# Patient Record
Sex: FEMALE | Race: OTHER | HISPANIC OR LATINO | Employment: UNEMPLOYED | URBAN - METROPOLITAN AREA
[De-identification: names, ages, dates, MRNs, and addresses within clinical notes are randomized per-mention and may not be internally consistent; named-entity substitution may affect disease eponyms.]

---

## 2022-09-22 ENCOUNTER — LAB VISIT (OUTPATIENT)
Dept: LAB | Facility: HOSPITAL | Age: 37
End: 2022-09-22
Attending: PSYCHIATRY & NEUROLOGY

## 2022-09-22 ENCOUNTER — OFFICE VISIT (OUTPATIENT)
Dept: NEUROLOGY | Facility: CLINIC | Age: 37
End: 2022-09-22

## 2022-09-22 VITALS
HEIGHT: 59 IN | BODY MASS INDEX: 25.38 KG/M2 | HEART RATE: 60 BPM | RESPIRATION RATE: 12 BRPM | WEIGHT: 125.88 LBS | DIASTOLIC BLOOD PRESSURE: 68 MMHG | SYSTOLIC BLOOD PRESSURE: 94 MMHG

## 2022-09-22 DIAGNOSIS — R71.8 ELEVATED MCV: ICD-10-CM

## 2022-09-22 DIAGNOSIS — R93.0 ABNORMAL CT OF THE HEAD: ICD-10-CM

## 2022-09-22 DIAGNOSIS — G40.419 OTHER GENERALIZED EPILEPSY, INTRACTABLE, WITHOUT STATUS EPILEPTICUS: Primary | ICD-10-CM

## 2022-09-22 LAB
FOLATE SERPL-MCNC: 10.9 NG/ML (ref 4–24)
VIT B12 SERPL-MCNC: 538 PG/ML (ref 210–950)

## 2022-09-22 PROCEDURE — 99205 PR OFFICE/OUTPT VISIT, NEW, LEVL V, 60-74 MIN: ICD-10-PCS | Mod: S$PBB,,, | Performed by: PSYCHIATRY & NEUROLOGY

## 2022-09-22 PROCEDURE — 99999 PR PBB SHADOW E&M-EST. PATIENT-LVL III: ICD-10-PCS | Mod: PBBFAC,,, | Performed by: PSYCHIATRY & NEUROLOGY

## 2022-09-22 PROCEDURE — 99999 PR PBB SHADOW E&M-EST. PATIENT-LVL III: CPT | Mod: PBBFAC,,, | Performed by: PSYCHIATRY & NEUROLOGY

## 2022-09-22 PROCEDURE — 99213 OFFICE O/P EST LOW 20 MIN: CPT | Mod: PBBFAC | Performed by: PSYCHIATRY & NEUROLOGY

## 2022-09-22 PROCEDURE — 82607 VITAMIN B-12: CPT | Performed by: PSYCHIATRY & NEUROLOGY

## 2022-09-22 PROCEDURE — 99205 OFFICE O/P NEW HI 60 MIN: CPT | Mod: S$PBB,,, | Performed by: PSYCHIATRY & NEUROLOGY

## 2022-09-22 PROCEDURE — 82746 ASSAY OF FOLIC ACID SERUM: CPT | Performed by: PSYCHIATRY & NEUROLOGY

## 2022-09-22 PROCEDURE — 36415 COLL VENOUS BLD VENIPUNCTURE: CPT | Performed by: PSYCHIATRY & NEUROLOGY

## 2022-09-22 RX ORDER — LEVETIRACETAM 500 MG/1
500 TABLET ORAL 2 TIMES DAILY
Qty: 60 TABLET | Refills: 11 | Status: ON HOLD | OUTPATIENT
Start: 2022-09-22 | End: 2022-10-02 | Stop reason: SDUPTHER

## 2022-09-22 RX ORDER — VALPROIC ACID 250 MG/1
250 CAPSULE, LIQUID FILLED ORAL 4 TIMES DAILY
Qty: 120 CAPSULE | Refills: 11 | Status: ON HOLD | OUTPATIENT
Start: 2022-09-22 | End: 2022-10-02 | Stop reason: HOSPADM

## 2022-09-22 NOTE — PROGRESS NOTES
USED    HPI: Jen March is a 37 y.o. female with seizure.  Seizures first began in her early childhood.     A typical seizure is described as beginning with aura of fear/anxiety and tears. She notes she has pain after a seizure and a great deal of distress emotionally  During a seizure, she feels something is moving her her head and she has tremors and she feels cold.  She has all of this bilaterally and looses time and forgets these spells.   Sometimes this occurs with sitting and she has a warning and can lie down or warn others.   Her nephew who has witnessed these spells states that seizures are stronger when she is out of meds. She talks about dead relatives and even claimed to be having a baby and named that baby  At other times she will not talk and bit her tongue.    Currently, she is having 2 seizures per week.   She is taking her medications fully only for the past 2 weeks. She had been out until then      She presented to the ER twice this summer with seizures and being out of meds/ needing refills. CT scan and labs from ER noted below    Normally, when she is able to get and take her medications daily, her seizures are milder and far less frequent.   She feels much better on meds. She has worse memory and overall her demeanor is changed off of medications.     She was born on time and denies prior infectious causes personally, but her mother was pregnant with her, her mother was very ill and it was believed to have effected her.   However, she states her seizure cause was never known. She did have a fever at 9 months old and seizures came after that per her aunt whom we contacted during the visit for additional history.       She is unemployed. She has not worked due to seizures.       She does not drive    She does not drink alcohol       Here with her  nephew today    She has one child. No plans for further children. Is not sexually active    Moved here from [place] this  year.     Review of Systems   Constitutional:  Negative for fever.   HENT:  Negative for nosebleeds.    Eyes:  Negative for double vision.   Respiratory:  Negative for hemoptysis.    Cardiovascular:  Negative for leg swelling.   Gastrointestinal:  Negative for blood in stool.   Genitourinary:  Negative for hematuria.   Skin:  Negative for rash.   Neurological:  Positive for seizures.   Endo/Heme/Allergies:  Does not bruise/bleed easily.   Psychiatric/Behavioral:  Positive for memory loss.        I have reviewed all of this patient's past medical and surgical histories as well as family and social histories and active allergies and medications as documented in the electronic medical record.        Exam:  Gen Appearance, well developed/nourished in no apparent distress  CV: 2+ distal pulses with no edema or swelling  Neuro:  MS: Awake, alert, oriented to place, person, time, situation. Sustains attention. Recent/remote memory intact, Language is full to spontaneous speech/repetition/naming/comprehension. Fund of Knowledge is full  CN: Optic discs are flat with normal vasculature, PERRL, Extraoccular movements and visual fields are full. Normal facial sensation and strength, Hearing symmetric, Tongue and Palate are midline and strong. Shoulder Shrug symmetric and strong.  Motor: Normal bulk, tone, no abnormal movements. 5/5 strength bilateral upper/lower extremities with 2+ reflexes and bilateral plantar response  Sensory: symmetric to light touch, pain, temp, and vibration/proprioception. Romberg negative  Cerebellar: Finger-nose,Heal-shin, Rapid alternating movements intact  Gait: Normal stance, no ataxia    Imagin2022 CT head: No acute intracranial hemorrhage or acute calvarial fracture.     Mild cerebellar atrophy which has been described in relation to seizure medication, please correlate.     6 mm calcification at the anteromedial right frontal cortex possibly reflecting postinfection calcification.    Labs:  2022 CBC shows elevated MCV count  CMP unremarkable  TSH, HIV and Hep C negative    Assessment/Plan: Jen March is a 37 y.o. female with seizures since her infancy  I recommend:     CT head 2022: mild cerebellar atrophy consistent with AED use  -also noted 6 mm calcification at the anteromedial right frontal cortex possibly reflecting postinfection calcification.  -Infection history noted in patient at age 9 months of age prior to seizures starting. Her mother also reportedly had a  infection and this may explain those findings    2. Clinically,  she does have tongue biting with spells. Although some spells seem emotionally mediated. Consider pseudoseizures in addition to epilepsy  -Will benefit from EEG when she has better funding (currently she is self pay)  -Continue current dose of Keppra 500mg and Depakote 250mg QID  -Asked her to keep a seizure diary so we can adjust accordingly for further seizures with more regular medication use  -will need routine labs on VPA    3. MCV elevation noted  -check B12 and folate levels  -denies EtOH use  -Elevated MCV is not a listed side effect of either of her AEDs  -She endorses some memory loss which may be related to seizures as well    4. Seizure precautions were fully reviewed with this patient who was instructed on:  No driving, no heights, no heavy machinery, no baths (shower instead), no swimming and no cooking on front burners of stoves.  The patient should be brought to the ER for any seizure activity lasting longer than  15 minutes or if two seizures are occurring back to back without full return to baseline mental status.    The patient was counseled on the teratogenic effects of anti-epileptic mediations including likely doubled rate of birth defects in infants born to mothers taking AEDs.  -She reports no plan for pregnancy/ not sexually active.       RTC 4 months    Total time spent on DOS including chart review:  60  minutes

## 2022-09-26 ENCOUNTER — HOSPITAL ENCOUNTER (INPATIENT)
Facility: HOSPITAL | Age: 37
LOS: 6 days | Discharge: HOME | DRG: 101 | End: 2022-10-02
Attending: INTERNAL MEDICINE | Admitting: INTERNAL MEDICINE
Payer: MEDICAID

## 2022-09-26 DIAGNOSIS — R56.9 SEIZURES: ICD-10-CM

## 2022-09-26 DIAGNOSIS — R07.9 CHEST PAIN: ICD-10-CM

## 2022-09-26 PROCEDURE — 11000001 HC ACUTE MED/SURG PRIVATE ROOM

## 2022-09-26 RX ORDER — POLYETHYLENE GLYCOL 3350 17 G/17G
17 POWDER, FOR SOLUTION ORAL DAILY
Status: DISCONTINUED | OUTPATIENT
Start: 2022-09-27 | End: 2022-10-02 | Stop reason: HOSPADM

## 2022-09-26 RX ORDER — LORAZEPAM 2 MG/ML
2 INJECTION INTRAMUSCULAR EVERY 4 HOURS PRN
Status: DISCONTINUED | OUTPATIENT
Start: 2022-09-26 | End: 2022-10-02 | Stop reason: HOSPADM

## 2022-09-26 RX ORDER — MAG HYDROX/ALUMINUM HYD/SIMETH 200-200-20
30 SUSPENSION, ORAL (FINAL DOSE FORM) ORAL 4 TIMES DAILY PRN
Status: DISCONTINUED | OUTPATIENT
Start: 2022-09-26 | End: 2022-10-02 | Stop reason: HOSPADM

## 2022-09-26 RX ORDER — LEVETIRACETAM 500 MG/1
1000 TABLET ORAL 2 TIMES DAILY
Status: DISCONTINUED | OUTPATIENT
Start: 2022-09-27 | End: 2022-09-28

## 2022-09-26 RX ORDER — NALOXONE HCL 0.4 MG/ML
0.02 VIAL (ML) INJECTION
Status: DISCONTINUED | OUTPATIENT
Start: 2022-09-26 | End: 2022-10-02 | Stop reason: HOSPADM

## 2022-09-26 RX ORDER — ACETAMINOPHEN 325 MG/1
650 TABLET ORAL EVERY 4 HOURS PRN
Status: DISCONTINUED | OUTPATIENT
Start: 2022-09-26 | End: 2022-10-02 | Stop reason: HOSPADM

## 2022-09-26 RX ORDER — IBUPROFEN 200 MG
24 TABLET ORAL
Status: DISCONTINUED | OUTPATIENT
Start: 2022-09-26 | End: 2022-10-02 | Stop reason: HOSPADM

## 2022-09-26 RX ORDER — IBUPROFEN 200 MG
16 TABLET ORAL
Status: DISCONTINUED | OUTPATIENT
Start: 2022-09-26 | End: 2022-10-02 | Stop reason: HOSPADM

## 2022-09-26 RX ORDER — AMOXICILLIN 250 MG
1 CAPSULE ORAL 2 TIMES DAILY PRN
Status: DISCONTINUED | OUTPATIENT
Start: 2022-09-26 | End: 2022-10-02 | Stop reason: HOSPADM

## 2022-09-26 RX ORDER — PROCHLORPERAZINE EDISYLATE 5 MG/ML
5 INJECTION INTRAMUSCULAR; INTRAVENOUS EVERY 6 HOURS PRN
Status: DISCONTINUED | OUTPATIENT
Start: 2022-09-26 | End: 2022-10-02 | Stop reason: HOSPADM

## 2022-09-26 RX ORDER — SIMETHICONE 80 MG
1 TABLET,CHEWABLE ORAL 4 TIMES DAILY PRN
Status: DISCONTINUED | OUTPATIENT
Start: 2022-09-26 | End: 2022-10-02 | Stop reason: HOSPADM

## 2022-09-26 RX ORDER — VALPROIC ACID 250 MG/1
250 CAPSULE, LIQUID FILLED ORAL 4 TIMES DAILY
Status: DISCONTINUED | OUTPATIENT
Start: 2022-09-27 | End: 2022-09-28

## 2022-09-26 RX ORDER — ONDANSETRON 2 MG/ML
4 INJECTION INTRAMUSCULAR; INTRAVENOUS EVERY 8 HOURS PRN
Status: DISCONTINUED | OUTPATIENT
Start: 2022-09-26 | End: 2022-10-02 | Stop reason: HOSPADM

## 2022-09-26 RX ORDER — SODIUM CHLORIDE 0.9 % (FLUSH) 0.9 %
10 SYRINGE (ML) INJECTION EVERY 12 HOURS PRN
Status: DISCONTINUED | OUTPATIENT
Start: 2022-09-26 | End: 2022-10-02 | Stop reason: HOSPADM

## 2022-09-26 RX ORDER — TALC
6 POWDER (GRAM) TOPICAL NIGHTLY PRN
Status: DISCONTINUED | OUTPATIENT
Start: 2022-09-26 | End: 2022-10-02 | Stop reason: HOSPADM

## 2022-09-26 RX ORDER — ENOXAPARIN SODIUM 100 MG/ML
40 INJECTION SUBCUTANEOUS EVERY 24 HOURS
Status: DISCONTINUED | OUTPATIENT
Start: 2022-09-26 | End: 2022-10-02 | Stop reason: HOSPADM

## 2022-09-26 RX ORDER — GLUCAGON 1 MG
1 KIT INJECTION
Status: DISCONTINUED | OUTPATIENT
Start: 2022-09-26 | End: 2022-10-02 | Stop reason: HOSPADM

## 2022-09-27 PROBLEM — R56.9 SEIZURES: Status: ACTIVE | Noted: 2022-09-27

## 2022-09-27 PROBLEM — F99 PSYCHIATRIC SYMPTOMS: Status: ACTIVE | Noted: 2022-09-27

## 2022-09-27 LAB
ALBUMIN SERPL BCP-MCNC: 3.4 G/DL (ref 3.5–5.2)
ALP SERPL-CCNC: 50 U/L (ref 55–135)
ALT SERPL W/O P-5'-P-CCNC: 9 U/L (ref 10–44)
ANION GAP SERPL CALC-SCNC: 6 MMOL/L (ref 8–16)
AST SERPL-CCNC: 17 U/L (ref 10–40)
BASOPHILS # BLD AUTO: 0.03 K/UL (ref 0–0.2)
BASOPHILS NFR BLD: 0.5 % (ref 0–1.9)
BILIRUB SERPL-MCNC: 0.4 MG/DL (ref 0.1–1)
BUN SERPL-MCNC: 7 MG/DL (ref 6–20)
CALCIUM SERPL-MCNC: 9 MG/DL (ref 8.7–10.5)
CHLORIDE SERPL-SCNC: 110 MMOL/L (ref 95–110)
CO2 SERPL-SCNC: 23 MMOL/L (ref 23–29)
CREAT SERPL-MCNC: 0.7 MG/DL (ref 0.5–1.4)
DIFFERENTIAL METHOD: ABNORMAL
EOSINOPHIL # BLD AUTO: 0.1 K/UL (ref 0–0.5)
EOSINOPHIL NFR BLD: 1.3 % (ref 0–8)
ERYTHROCYTE [DISTWIDTH] IN BLOOD BY AUTOMATED COUNT: 11.7 % (ref 11.5–14.5)
EST. GFR  (NO RACE VARIABLE): >60 ML/MIN/1.73 M^2
GLUCOSE SERPL-MCNC: 76 MG/DL (ref 70–110)
HCT VFR BLD AUTO: 37.8 % (ref 37–48.5)
HGB BLD-MCNC: 13 G/DL (ref 12–16)
IMM GRANULOCYTES # BLD AUTO: 0.01 K/UL (ref 0–0.04)
IMM GRANULOCYTES NFR BLD AUTO: 0.2 % (ref 0–0.5)
LYMPHOCYTES # BLD AUTO: 2.3 K/UL (ref 1–4.8)
LYMPHOCYTES NFR BLD: 38.2 % (ref 18–48)
MAGNESIUM SERPL-MCNC: 2.3 MG/DL (ref 1.6–2.6)
MCH RBC QN AUTO: 36.3 PG (ref 27–31)
MCHC RBC AUTO-ENTMCNC: 34.4 G/DL (ref 32–36)
MCV RBC AUTO: 106 FL (ref 82–98)
MONOCYTES # BLD AUTO: 0.7 K/UL (ref 0.3–1)
MONOCYTES NFR BLD: 12.1 % (ref 4–15)
NEUTROPHILS # BLD AUTO: 2.9 K/UL (ref 1.8–7.7)
NEUTROPHILS NFR BLD: 47.7 % (ref 38–73)
NRBC BLD-RTO: 0 /100 WBC
PHOSPHATE SERPL-MCNC: 3.7 MG/DL (ref 2.7–4.5)
PLATELET # BLD AUTO: 114 K/UL (ref 150–450)
PMV BLD AUTO: 11.4 FL (ref 9.2–12.9)
POTASSIUM SERPL-SCNC: 4 MMOL/L (ref 3.5–5.1)
PROT SERPL-MCNC: 6.6 G/DL (ref 6–8.4)
RBC # BLD AUTO: 3.58 M/UL (ref 4–5.4)
SODIUM SERPL-SCNC: 139 MMOL/L (ref 136–145)
WBC # BLD AUTO: 6.1 K/UL (ref 3.9–12.7)

## 2022-09-27 PROCEDURE — 84100 ASSAY OF PHOSPHORUS: CPT | Performed by: INTERNAL MEDICINE

## 2022-09-27 PROCEDURE — 80053 COMPREHEN METABOLIC PANEL: CPT | Performed by: INTERNAL MEDICINE

## 2022-09-27 PROCEDURE — 86592 SYPHILIS TEST NON-TREP QUAL: CPT | Performed by: INTERNAL MEDICINE

## 2022-09-27 PROCEDURE — A9585 GADOBUTROL INJECTION: HCPCS | Performed by: STUDENT IN AN ORGANIZED HEALTH CARE EDUCATION/TRAINING PROGRAM

## 2022-09-27 PROCEDURE — 25500020 PHARM REV CODE 255: Performed by: STUDENT IN AN ORGANIZED HEALTH CARE EDUCATION/TRAINING PROGRAM

## 2022-09-27 PROCEDURE — 99222 PR INITIAL HOSPITAL CARE,LEVL II: ICD-10-PCS | Mod: ,,, | Performed by: PSYCHIATRY & NEUROLOGY

## 2022-09-27 PROCEDURE — 86682 HELMINTH ANTIBODY: CPT | Performed by: INTERNAL MEDICINE

## 2022-09-27 PROCEDURE — 85025 COMPLETE CBC W/AUTO DIFF WBC: CPT | Performed by: INTERNAL MEDICINE

## 2022-09-27 PROCEDURE — 36415 COLL VENOUS BLD VENIPUNCTURE: CPT | Performed by: INTERNAL MEDICINE

## 2022-09-27 PROCEDURE — 95812 EEG 41-60 MINUTES: CPT | Mod: 26,,, | Performed by: STUDENT IN AN ORGANIZED HEALTH CARE EDUCATION/TRAINING PROGRAM

## 2022-09-27 PROCEDURE — 25000003 PHARM REV CODE 250: Performed by: INTERNAL MEDICINE

## 2022-09-27 PROCEDURE — 95813 EEG EXTND MNTR 61-119 MIN: CPT

## 2022-09-27 PROCEDURE — 83735 ASSAY OF MAGNESIUM: CPT | Performed by: INTERNAL MEDICINE

## 2022-09-27 PROCEDURE — C9254 INJECTION, LACOSAMIDE: HCPCS | Performed by: PSYCHIATRY & NEUROLOGY

## 2022-09-27 PROCEDURE — 99222 1ST HOSP IP/OBS MODERATE 55: CPT | Mod: ,,, | Performed by: PSYCHIATRY & NEUROLOGY

## 2022-09-27 PROCEDURE — 25000003 PHARM REV CODE 250: Performed by: PSYCHIATRY & NEUROLOGY

## 2022-09-27 PROCEDURE — 95812 PR EEG,EXTENDED MONITORING,41-60 MINUTES: ICD-10-PCS | Mod: 26,,, | Performed by: STUDENT IN AN ORGANIZED HEALTH CARE EDUCATION/TRAINING PROGRAM

## 2022-09-27 PROCEDURE — 63600175 PHARM REV CODE 636 W HCPCS: Performed by: INTERNAL MEDICINE

## 2022-09-27 PROCEDURE — 80321 ALCOHOLS BIOMARKERS 1OR 2: CPT | Performed by: INTERNAL MEDICINE

## 2022-09-27 PROCEDURE — 63600175 PHARM REV CODE 636 W HCPCS: Performed by: PSYCHIATRY & NEUROLOGY

## 2022-09-27 PROCEDURE — 11000001 HC ACUTE MED/SURG PRIVATE ROOM

## 2022-09-27 RX ORDER — GADOBUTROL 604.72 MG/ML
10 INJECTION INTRAVENOUS
Status: COMPLETED | OUTPATIENT
Start: 2022-09-27 | End: 2022-09-27

## 2022-09-27 RX ORDER — LEVETIRACETAM 10 MG/ML
1000 INJECTION INTRAVASCULAR ONCE
Status: COMPLETED | OUTPATIENT
Start: 2022-09-27 | End: 2022-09-27

## 2022-09-27 RX ADMIN — LEVETIRACETAM INJECTION 1000 MG: 10 INJECTION INTRAVENOUS at 05:09

## 2022-09-27 RX ADMIN — VALPROIC ACID 250 MG: 250 CAPSULE ORAL at 05:09

## 2022-09-27 RX ADMIN — ENOXAPARIN SODIUM 40 MG: 100 INJECTION SUBCUTANEOUS at 12:09

## 2022-09-27 RX ADMIN — POLYETHYLENE GLYCOL 3350 17 G: 17 POWDER, FOR SOLUTION ORAL at 08:09

## 2022-09-27 RX ADMIN — LORAZEPAM 1 MG: 2 INJECTION INTRAMUSCULAR; INTRAVENOUS at 01:09

## 2022-09-27 RX ADMIN — LEVETIRACETAM 1000 MG: 500 TABLET, FILM COATED ORAL at 08:09

## 2022-09-27 RX ADMIN — VALPROIC ACID 250 MG: 250 CAPSULE ORAL at 09:09

## 2022-09-27 RX ADMIN — SODIUM CHLORIDE 200 MG: 9 INJECTION, SOLUTION INTRAVENOUS at 05:09

## 2022-09-27 RX ADMIN — ENOXAPARIN SODIUM 40 MG: 100 INJECTION SUBCUTANEOUS at 05:09

## 2022-09-27 RX ADMIN — GADOBUTROL 10 ML: 604.72 INJECTION INTRAVENOUS at 02:09

## 2022-09-27 RX ADMIN — VALPROIC ACID 250 MG: 250 CAPSULE ORAL at 08:09

## 2022-09-27 RX ADMIN — VALPROIC ACID 250 MG: 250 CAPSULE ORAL at 02:09

## 2022-09-27 RX ADMIN — ACETAMINOPHEN 650 MG: 325 TABLET ORAL at 09:09

## 2022-09-27 RX ADMIN — LEVETIRACETAM 1000 MG: 500 TABLET, FILM COATED ORAL at 09:09

## 2022-09-27 NOTE — ASSESSMENT & PLAN NOTE
Though I'm sure the language barrier is making it difficult, she has an extremely odd affect  Will defer to Dr. Weaver to see if perhaps she warrants Psychiatry consult   -(shivam if EEG is normal and psychogenic non-epileptic seizures are thought more likely)  Check PETH to rule out ETOH use

## 2022-09-27 NOTE — PROCEDURES
ELECTROENCEPHALOGRAM REPORT    DATE OF SERVICE: 09/27/22  EEG NUMBER: OW   REQUESTED BY: SARAH Bautista MD   [place] [facility] [place]roencephalographic (EEG) recording is with electrodes placed according to the International 10-20 placement system.  Thirty two (32) channels of digital signal (sampling rate of 512/sec) including T1 and T2 was simultaneously recorded from the scalp and may include  EKG, EMG, and/or eye monitors.  Recording band pass was 0.1 to 512 hz.  Digital video recording of the patient is simultaneously recorded with the EEG.  The patient is instructed report clinical symptoms which may occur during the recording session.  EEG and video recording is stored and archived in digital format. Activation procedures which include photic stimulation, hyperventilation and instructing patients to perform simple task are done in selected patients.    The EEG is displayed on a monitor screen and can be reviewed using different montages.  Computer assisted analysis is employed to detect spike and electrographic seizure activity.   The entire record is submitted for computer analysis.  The entire recording is visually reviewed and the times identified by computer analysis as being spikes or seizures are reviewed again.  Compresses spectral analysis (CSA) is also performed on the activity recorded from each individual channel.  This is displayed as a power display of frequencies from 0 to 30 Hz over time.   The CSA is reviewed looking for asymmetries in power between homologous areas of the scalp and then compared with the original EEG recording.     [employer] software was not utilized in the review of this study.    Indication: 37 year old female from [place] presenting to the hospital after being found by her nephew unresponsive in her bed.  Upon presentation to OSH she was noted to have seizure activity.      State of Consciousness:   Awake and asleep    Background:    Organization: Well organized  Symmetry: Symmetric  Continuity: Continuous  Background frequencies:   Background while patient is awake consists of a mixture of frequencies in all bands.  There is a normal anterior-posterior gradient with more beta activity present frontally and alpha activity present posteriorly.  There is significant movement artifact while patient is awake so the posterior dominant rhythm is not well sustained.   Posterior dominant rhythm:   Poorly sustained but visible PDR of 8 hz is present, symmetric, and reactive to eye opening and closure.     Sleep:   The patient reaches stage 2 sleep with symmetric vertex waves, sleep spindles, and k complexes noted.  Patient is asleep at start of record and majority of record spent in sleep state, but patient awakens at end with a normal PDR visible.      Non-epileptiform Abnormalities:  None    Epileptiform Abnormalities:   Spikes, left fronto-temporal (F7>Fp1>T3) occur rarely (5x during entire record)    EKG:   Normal sinus rhythm    Activating procedures:   Hyperventilation and photic stimulation not performed    Events:   None      Impression:   Abnormal 1 hour EEG indicative of left frontal-temporal epileptogenicity with rare epileptiform discharges occurring in this region.  There are no seizures recorded.    Clinical interpretation:   The presence of left fronto-temporal epileptiform discharges places the patient at risk for seizures arising from this region.  No seizures are seen during this record.      Lisa Ramos MD  [facility]   Department of Neurology

## 2022-09-27 NOTE — PLAN OF CARE
Problem: Adult Inpatient Plan of Care  Goal: Plan of Care Review  Outcome: Ongoing, Progressing     Problem: Adult Inpatient Plan of Care  Goal: Patient-Specific Goal (Individualized)  Outcome: Ongoing, Progressing     Problem: Adult Inpatient Plan of Care  Goal: Optimal Comfort and Wellbeing  Outcome: Ongoing, Progressing     Problem: Adult Inpatient Plan of Care  Goal: Readiness for Transition of Care  Outcome: Ongoing, Progressing

## 2022-09-27 NOTE — ASSESSMENT & PLAN NOTE
Her seizure history is difficult to parse out, as she is very vague about it  She is also very vague as to whether she is taking any of her medications (she cannot tell me their names)  She tells me all this started 4 years ago, but is not forthcoming about how much work up she has had    In the ED she was treated with Fosphenytoin 20mg/Kg (1135mg) IV 1251, Keppra 1g iv 0821, Ativan 4mg iv 0816, and NS 1LL bolus 0829.   Neuro checks q4 hours  Seizure precautions  Holding on further therapy with Dilantin  Consult Dr. Weaver with Neurology  EEG ordered  HIV 9/9 negative, RPR ordered  MRI brain, epilepsy protocol with and without ordered   -This will also rule out neurocysticercosis.      -Cysticercosis IgG ordered too         levETIRAcetam tablet 1,000 mg, 1,000 mg, Oral, BID     LORazepam injection 2 mg, 2 mg, Intravenous, Q4H PRN, seizure    valproic acid capsule 250 mg, 250 mg, Oral, QID

## 2022-09-27 NOTE — NURSING
Used  [id number] to answer questions patient had in reference to managing her seizures at home. Dr Weaver neurologist will see her today to discuss; verbalized understanding, needs reinforcement.

## 2022-09-27 NOTE — CARE UPDATE
Patient seen and examined.  H&P reviewed.  Updates below.      Ms. March is a 37-year-old female who was admitted after witnessed seizures.  She was continued on her home anti epileptic medications and Neurology consulted.  She had an EEG today which was abnormal noting abnormal left fronto temporal foci and attempted to undergo MRI where she had to seizures that responded to Ativan.  Neurology following and adjusting medications.  She was recently seen last week by Neurology.    Jean Pierre Infante MD  Department of Hospital Medicine  [facility]

## 2022-09-27 NOTE — H&P
"[facility] Medicine  History & Physical    Patient Name: Jen March  MRN: [id number]  Patient Class: IP- Inpatient  Admission Date: 9/26/2022  Attending Physician: Jeanne Plaza DO   Primary Care Provider: Primary Doctor No         Patient information was obtained from patient, past medical records and ER records.     Subjective:     Principal Problem:Uncontrolled seizures    Chief Complaint: "My calf was swollen and painful, but it's better now"   -Sent in transfer from [facility] after presenting there for seizure       HPI: Ms. Graves is a 38yo lady with a past medical history of seizure disorder.  She is on Keppra and Depakene at baseline.  Her family told the ED provider that she was due to get her Keppra filled today after completely running out (raising concerns for compliance).    Per sending MD, she was found unresponsive in her bed by her nephew this morning.  Her nephew also relayed that she had had a breakthrough seizure yesterday as well.  Her family then drove her to the ED at [facility].  She was confused on arrival with noted tremors and unresponsiveness from possible seizure activity.  They gave Ativan and the symptoms resolved.  After loading with the Keppra and Fosphenytoin below, she was still having some decreased level of awareness and periods of "staring off."    She tells me she is visiting family here from [place] for the past 3 days (this is not true, as she has seen the ED here in July for seizures and refills on medications).  She thinks she came to the hospital due to left calf pain going up her leg.  She is very vague and evasive when asking about her history of seizures.  She originally smiled and looked away, stating, "since I was young."  As I pinned her down exactly she finally volunteered "4 years."  She gets her seizure medicine Rx's from a doctor in [place].  She has an odd demeanor and states, "I've cried a lot since I was young."     In the ED " "her VS's were /61   Pulse 82   Temp 97.7 °F (36.5 °C) (Oral)   Resp 18   Ht 4' 10" (1.473 m)   Wt 56.7 kg (125 lb)   LMP 2022 (Exact Date)   SpO2 100%   Breastfeeding No   BMI 26.13 kg/m².   Labs showed , Hg 12.9, Cr 0.8, otherwise normal CBC, CMP.  Valproate 69 ( normal), ETOH < 10, UPT neg, UDS negative.  COVID NEG.  UA negative for infection.     NC CT head showed no changes compared to CT head 2022 including the mild cerebellar atrophy and focal calcifications at the medial right frontal and lateral left posterior frontal parietal cortex.  EKG showed NSR rate 83, normal.      In the ED she was treated with Fosphenytoin 20mg/Kg (1135mg) IV 1251, Keppra 1g iv 0821, Ativan 4mg iv 0816, and NS 1LL bolus 0829.       Past Medical History:   Diagnosis Date    Seizures        No past surgical history on file.    Review of patient's allergies indicates:  No Known Allergies    Current Facility-Administered Medications on File Prior to Encounter   Medication    [COMPLETED] FOSphenytoin (CEREBYX) 1,135 mg PE in dextrose 5 % 100 mL IVPB    [COMPLETED] levETIRAcetam injection 1,000 mg    [] lorazepam (ATIVAN) 2 mg/mL injection    [COMPLETED] lorazepam (ATIVAN) injection 4 mg    [COMPLETED] sodium chloride 0.9% bolus 1,000 mL    [DISCONTINUED] levETIRAcetam in NaCl (iso-os) (KEPPRA) 1,000 mg/100 mL IVPB    [DISCONTINUED] sodium chloride 0.9% bolus 1,000 mL     Current Outpatient Medications on File Prior to Encounter   Medication Sig    levETIRAcetam (KEPPRA) 500 MG Tab Take 1 tablet (500 mg total) by mouth 2 (two) times daily.    valproic acid (DEPAKENE) 250 mg capsule Take 1 capsule (250 mg total) by mouth 4 (four) times daily.     Family History       Problem Relation (Age of Onset)    Asthma Father    Diabetes Mother    Hypertension Mother          Tobacco Use    Smoking status: Never     Passive exposure: Never    Smokeless tobacco: Never   Substance and Sexual " Activity    Alcohol use: Never    Drug use: Never    Sexual activity: Not Currently     Partners: Male     Review of Systems   Constitutional:  Positive for activity change, appetite change and fatigue.   HENT:  Negative for congestion.    Eyes:  Negative for photophobia.   Respiratory:  Negative for shortness of breath.    Cardiovascular:  Positive for leg swelling.   Gastrointestinal:  Negative for diarrhea, nausea and vomiting.   Endocrine: Negative for polydipsia.   Genitourinary:  Negative for dysuria.   Musculoskeletal:  Positive for myalgias.   Skin:  Negative for rash.   Allergic/Immunologic: Negative for immunocompromised state.   Neurological:  Positive for seizures and headaches.   Hematological:  Negative for adenopathy.   Psychiatric/Behavioral:  Positive for behavioral problems, decreased concentration, dysphoric mood and sleep disturbance.    Objective:     Vital Signs (Most Recent):  Temp: 98 °F (36.7 °C) (09/26/22 2300)  Pulse: 85 (09/26/22 2300)  Resp: 18 (09/26/22 2300)  BP: (!) 165/65 (09/26/22 2300)  SpO2: 100 % (09/26/22 2300) Vital Signs (24h Range):  Temp:  [97.7 °F (36.5 °C)-98 °F (36.7 °C)] 98 °F (36.7 °C)  Pulse:  [76-90] 85  Resp:  [10-22] 18  SpO2:  [96 %-100 %] 100 %  BP: ()/(54-78) 165/65     Weight: 58.1 kg (128 lb 1.4 oz)  Body mass index is 26.77 kg/m².    Physical Exam  Constitutional:       General: She is not in acute distress.     Appearance: She is overweight. She is not ill-appearing, toxic-appearing or diaphoretic.   HENT:      Head: Normocephalic and atraumatic.   Eyes:      Conjunctiva/sclera:      Right eye: Right conjunctiva is not injected.      Left eye: Left conjunctiva is not injected.   Neck:      Thyroid: No thyromegaly.   Cardiovascular:      Rate and Rhythm: Normal rate and regular rhythm.      Heart sounds:   No systolic murmur is present.   Pulmonary:      Effort: Pulmonary effort is normal.      Breath sounds: Normal breath sounds. No decreased breath  sounds, wheezing, rhonchi or rales.   Chest:      Chest wall: No swelling or tenderness.   Abdominal:      General: Abdomen is protuberant. Bowel sounds are normal. There is no distension.      Tenderness: There is generalized abdominal tenderness.   Genitourinary:     Comments: No knight in place  Musculoskeletal:      Cervical back: Full passive range of motion without pain.   Lymphadenopathy:      Comments: No peripheral edema   Skin:     General: Skin is warm and dry.      Findings: No bruising.   Neurological:      General: No focal deficit present.      Mental Status: She is oriented to person, place, and time. She is lethargic.      GCS: GCS eye subscore is 4. GCS verbal subscore is 5. GCS motor subscore is 6.   Psychiatric:         Attention and Perception: She is inattentive.         Mood and Affect: Mood is depressed. Affect is labile and flat.         Speech: Speech is delayed and tangential.         Behavior: Behavior is slowed and withdrawn.         Cognition and Memory: Cognition is not impaired. Memory is impaired. She exhibits impaired recent memory. She does not exhibit impaired remote memory.           Significant Labs: All pertinent labs within the past 24 hours have been reviewed.  Recent Results (from the past 24 hour(s))   CBC auto differential    Collection Time: 09/26/22  8:31 AM   Result Value Ref Range    WBC 6.19 3.90 - 12.70 K/uL    RBC 3.59 (L) 4.00 - 5.40 M/uL    Hemoglobin 12.9 12.0 - 16.0 g/dL    Hematocrit 37.4 37.0 - 48.5 %     (H) 82 - 98 fL    MCH 35.9 (H) 27.0 - 31.0 pg    MCHC 34.5 32.0 - 36.0 g/dL    RDW 11.9 11.5 - 14.5 %    Platelets 110 (L) 150 - 450 K/uL    MPV 11.0 9.2 - 12.9 fL    Immature Granulocytes 0.2 0.0 - 0.5 %    Gran # (ANC) 2.7 1.8 - 7.7 K/uL    Immature Grans (Abs) 0.01 0.00 - 0.04 K/uL    Lymph # 2.5 1.0 - 4.8 K/uL    Mono # 0.9 0.3 - 1.0 K/uL    Eos # 0.0 0.0 - 0.5 K/uL    Baso # 0.02 0.00 - 0.20 K/uL    nRBC 0 0 /100 WBC    Gran % 44.3 38.0 - 73.0 %     Lymph % 39.7 18.0 - 48.0 %    Mono % 15.0 4.0 - 15.0 %    Eosinophil % 0.5 0.0 - 8.0 %    Basophil % 0.3 0.0 - 1.9 %    Differential Method Automated    Comprehensive metabolic panel    Collection Time: 09/26/22  8:31 AM   Result Value Ref Range    Sodium 140 136 - 145 mmol/L    Potassium 3.7 3.5 - 5.1 mmol/L    Chloride 109 95 - 110 mmol/L    CO2 23 23 - 29 mmol/L    Glucose 95 70 - 110 mg/dL    BUN 10 6 - 20 mg/dL    Creatinine 0.8 0.5 - 1.4 mg/dL    Calcium 9.1 8.7 - 10.5 mg/dL    Total Protein 6.8 6.0 - 8.4 g/dL    Albumin 3.4 (L) 3.5 - 5.2 g/dL    Total Bilirubin 0.4 0.1 - 1.0 mg/dL    Alkaline Phosphatase 48 (L) 55 - 135 U/L    AST 18 10 - 40 U/L    ALT 7 (L) 10 - 44 U/L    Anion Gap 8 8 - 16 mmol/L    eGFR >60.0 >60 mL/min/1.73 m^2   Valproic Acid    Collection Time: 09/26/22  8:31 AM   Result Value Ref Range    Valproic Acid Level 69.7 50.0 - 100.0 ug/mL   Ethanol    Collection Time: 09/26/22  8:31 AM   Result Value Ref Range    Alcohol, Serum <10 <10 mg/dL   Urinalysis, Reflex to Urine Culture Urine, Clean Catch    Collection Time: 09/26/22 11:55 AM    Specimen: Urine   Result Value Ref Range    Specimen UA Urine, Clean Catch     Color, UA Yellow Yellow, Straw, Jada    Appearance, UA Clear Clear    pH, UA 7.0 5.0 - 8.0    Specific Gravity, UA 1.010 1.005 - 1.030    Protein, UA Negative Negative    Glucose, UA Negative Negative    Ketones, UA Negative Negative    Bilirubin (UA) Negative Negative    Occult Blood UA 2+ (A) Negative    Nitrite, UA Negative Negative    Leukocytes, UA Negative Negative   Drug screen panel, emergency    Collection Time: 09/26/22 11:55 AM   Result Value Ref Range    Benzodiazepines Negative Negative    Methadone metabolites Negative Negative    Cocaine (Metab.) Negative Negative    Opiate Scrn, Ur Negative Negative    Barbiturate Screen, Ur Negative Negative    Amphetamine Screen, Ur Negative Negative    THC Negative Negative    Phencyclidine Negative Negative    Creatinine, Urine  73.5 15.0 - 325.0 mg/dL    Toxicology Information SEE COMMENT    Urinalysis Microscopic    Collection Time: 09/26/22 11:55 AM   Result Value Ref Range    RBC, UA 1 0 - 4 /hpf    WBC, UA 2 0 - 5 /hpf    Squam Epithel, UA 0 /hpf    Microscopic Comment SEE COMMENT    POCT urine pregnancy    Collection Time: 09/26/22 11:56 AM   Result Value Ref Range    POC Preg Test, Ur Negative Negative     Acceptable Yes    POCT COVID-19 Rapid Screening    Collection Time: 09/26/22  4:23 PM   Result Value Ref Range    POC Rapid COVID Negative Negative     Acceptable Yes          Significant Imaging: I have reviewed all pertinent imaging results/findings within the past 24 hours.    Assessment/Plan:     * Uncontrolled seizures  Her seizure history is difficult to parse out, as she is very vague about it  She is also very vague as to whether she is taking any of her medications (she cannot tell me their names)  She tells me all this started 4 years ago, but is not forthcoming about how much work up she has had    In the ED she was treated with Fosphenytoin 20mg/Kg (1135mg) IV 1251, Keppra 1g iv 0821, Ativan 4mg iv 0816, and NS 1LL bolus 0829.   Neuro checks q4 hours  Seizure precautions  Holding on further therapy with Dilantin  Consult Dr. Weaver with Neurology  EEG ordered  HIV 9/9 negative, RPR ordered  MRI brain, epilepsy protocol with and without ordered   -This will also rule out neurocysticercosis.      -Cysticercosis IgG ordered too         levETIRAcetam tablet 1,000 mg, 1,000 mg, Oral, BID     LORazepam injection 2 mg, 2 mg, Intravenous, Q4H PRN, seizure    valproic acid capsule 250 mg, 250 mg, Oral, QID         Psychiatric symptoms  Though I'm sure the language barrier is making it difficult, she has an extremely odd affect  Will defer to Dr. Weaver to see if perhaps she warrants Psychiatry consult   -(shivam if EEG is normal and psychogenic non-epileptic seizures are thought more likely)  Check  [facility] to rule out ETOH use        VTE Risk Mitigation (From admission, onward)         Ordered     enoxaparin injection 40 mg  Daily         09/26/22 2333     Place sequential compression device  Until discontinued         09/26/22 2333     Place RAVI hose  Until discontinued         09/26/22 2333                   KONRAD Bautista MD  Department of Hospital Medicine   [facility]

## 2022-09-27 NOTE — HPI
"Ms. Graves is a 36yo lady with a past medical history of seizure disorder.  She is on Keppra and Depakene at baseline.  Her family told the ED provider that she was due to get her Keppra filled today after completely running out (raising concerns for compliance).    Per sending MD, she was found unresponsive in her bed by her nephew this morning.  Her nephew also relayed that she had had a breakthrough seizure yesterday as well.  Her family then drove her to the ED at [facility].  She was confused on arrival with noted tremors and unresponsiveness from possible seizure activity.  They gave Ativan and the symptoms resolved.  After loading with the Keppra and Fosphenytoin below, she was still having some decreased level of awareness and periods of "staring off."    She tells me she is visiting family here from [place] for the past 3 days (this is not true, as she has seen the ED here in July for seizures and refills on medications).  She thinks she came to the hospital due to left calf pain going up her leg.  She is very vague and evasive when asking about her history of seizures.  She originally smiled and looked away, stating, "since I was young."  As I pinned her down exactly she finally volunteered "4 years."  She gets her seizure medicine Rx's from a doctor in [place].  She has an odd demeanor and states, "I've cried a lot since I was young."     In the ED her VS's were /61   Pulse 82   Temp 97.7 °F (36.5 °C) (Oral)   Resp 18   Ht 4' 10" (1.473 m)   Wt 56.7 kg (125 lb)   LMP 09/04/2022 (Exact Date)   SpO2 100%   Breastfeeding No   BMI 26.13 kg/m².   Labs showed , Hg 12.9, Cr 0.8, otherwise normal CBC, CMP.  Valproate 69 ( normal), ETOH < 10, UPT neg, UDS negative.  COVID NEG.  UA negative for infection.     NC CT head showed no changes compared to CT head 09/08/2022 including the mild cerebellar atrophy and focal calcifications at the medial right frontal and lateral left posterior " frontal parietal cortex.  EKG showed NSR rate 83, normal.      In the ED she was treated with Fosphenytoin 20mg/Kg (1135mg) IV 1251, Keppra 1g iv 0821, Ativan 4mg iv 0816, and NS 1LL bolus 0829.

## 2022-09-27 NOTE — NURSING
Nurses Note -- 4 Eyes      9/27/2022   6:06 AM      Skin assessed during: Admit      [x] No Pressure Injuries Present    []Prevention Measures Documented      [] Yes- Altered Skin Integrity Present or Discovered   [] LDA Added if Not in Epic (Describe Wound)   [] New Altered Skin Integrity was Present on Admit and Documented in LDA   [] Wound Image Taken    Wound Care Consulted? No    Attending Nurse:  Ebonie Weeks RN     Second RN/Staff Member:  ROBBIE Dempsey

## 2022-09-27 NOTE — PROGRESS NOTES
MEWS Monitoring: Chart check completed, abnormal VS noted, bedside RN, Debbie contacted, MD aware/ following, instructed to call [phone] for further concerns or assistance..    MD Weaver placing new orders.

## 2022-09-27 NOTE — PLAN OF CARE
[place] [facility]  Initial Discharge Assessment       Primary Care Provider: (Sister did not know the name of the PCP but stated that patient had just begun to see a doctor in [place]).    Admission Diagnosis: Seizures [R56.9]    Admission Date: 9/26/2022  Expected Discharge Date: TBD    Discharge Barriers Identified: None    Payor: /     Extended Emergency Contact Information  Primary Emergency Contact: Joselin Hoyt  Mobile Phone: [phone]  Relation: Sister  Secondary Emergency Contact: HENRYMONA  Mobile Phone: [phone]  Relation: Relative  Preferred language: English   needed? No    Discharge Plan A: Home with family  Discharge Plan B: Home with family      [facility] [address] [place] [address], [place] - [address] [place] [address] ROAD  [address] [place] [address]  Phone: [phone] Fax: [phone]      Initial Assessment (most recent)       Adult Discharge Assessment - 09/27/22 1510          Discharge Assessment    Assessment Type Discharge Planning Assessment     Confirmed/corrected address, phone number and insurance Yes     Confirmed Demographics Correct on Facesheet     Source of Information family   Sister:  Joselin Hoyt;  [phone] and niece: Mona Hoyt  [phone]    When was your last doctors appointment? --   Just started seeing a doctor in [place].  Sister did not know the name of the doctor because her nephew normally transports her to the appointment(s).    Communicated VANESSA with patient/caregiver No     Reason For Admission Transferred in from [facility] :  Seizures     Lives With sibling(s)   Lives with sister, Joselin Hoyt;  [phone]    Facility Arrived From: Home     Do you expect to return to your current living situation? Yes     Do you have help at home or someone to help you manage your care at home? Yes     Who are your caregiver(s) and their phone number(s)? Sister;  Joselin Hoyt;  [phone]     Prior to hospitilization cognitive status:  Unable to Assess     Current cognitive status: Unable to Assess     Walking or Climbing Stairs Difficulty none     Dressing/Bathing Difficulty other (see comments)   Per sister; sometimes needs assistance.    Equipment Currently Used at Home none     Readmission within 30 days? No     Patient currently being followed by outpatient case management? No     Do you currently have service(s) that help you manage your care at home? No     Do you take prescription medications? Yes     Do you have prescription coverage? No     Do you have any problems affording any of your prescribed medications? TBD   Sister reported that she helps patient pay for her medication.    Who is going to help you get home at discharge? A family member will transport patient home.     How do you get to doctors appointments? family or friend will provide     Are you on dialysis? No     Do you take coumadin? No     Discharge Plan A Home with family     Discharge Plan B Home with family     DME Needed Upon Discharge  none     Discharge Plan discussed with: Sibling     Name(s) and Number(s) Joselin Hoyt; sister;  [phone]     Discharge Barriers Identified None        Relationship/Environment    Name(s) of Who Lives With Patient Sister;  Joselin Hoyt;                   Discharge planning assessment completed with assistance from patient's sister, Joselin Hoyt, via telephone.  Patient lives with sister in [place].  She needed some assistance with ADLs but had no DME or services, e.g., home health.  Patient's sister reported that patient recently established care with a doctor in [place], but she did not know the doctor's name because she didn't transport patient to the appointment.  Patient's sister stated that patient's nephew transports her to appointments because he speaks English.  When medically stable, patient will discharge to home.  Patient's sister will have a family member pickup patient at discharge.      Addendum:  Assessment  completed with patient's sister, Joselin Hoyt, via telephone with assistance of Language Line :  Ryan ID#[id number]

## 2022-09-27 NOTE — NURSING
Upon departure from floor to MRI: patient awake, alert, and oriented x 4. No apparent distress noted at this time.

## 2022-09-27 NOTE — NURSING
Upon arrival to floor from MRI: patient oriented to room, call bell in reach and bed in lowest position. Denies pain or discomfort at this time. No apparent distress noted.

## 2022-09-27 NOTE — PLAN OF CARE
Problem: Adult Inpatient Plan of Care  Goal: Plan of Care Review  Outcome: Ongoing, Progressing  Flowsheets (Taken 9/27/2022 0815)  Plan of Care Reviewed With: patient  Goal: Patient-Specific Goal (Individualized)  Outcome: Ongoing, Progressing  Goal: Optimal Comfort and Wellbeing  Outcome: Ongoing, Progressing  Intervention: Monitor Pain and Promote Comfort  Flowsheets (Taken 9/27/2022 0815)  Pain Management Interventions:   around-the-clock dosing utilized   care clustered   medication offered   pillow support provided   quiet environment facilitated  Intervention: Provide Person-Centered Care  Flowsheets (Taken 9/27/2022 0815)  Trust Relationship/Rapport:   care explained   questions encouraged     Problem: Adult Inpatient Plan of Care  Goal: Plan of Care Review  Outcome: Ongoing, Progressing  Flowsheets (Taken 9/27/2022 0815)  Plan of Care Reviewed With: patient     Problem: Adult Inpatient Plan of Care  Goal: Plan of Care Review  Outcome: Ongoing, Progressing  Flowsheets (Taken 9/27/2022 0815)  Plan of Care Reviewed With: patient     Problem: Adult Inpatient Plan of Care  Goal: Patient-Specific Goal (Individualized)  Outcome: Ongoing, Progressing     Problem: Adult Inpatient Plan of Care  Goal: Patient-Specific Goal (Individualized)  Outcome: Ongoing, Progressing     Problem: Adult Inpatient Plan of Care  Goal: Optimal Comfort and Wellbeing  Outcome: Ongoing, Progressing  Intervention: Monitor Pain and Promote Comfort  Flowsheets (Taken 9/27/2022 0815)  Pain Management Interventions:   around-the-clock dosing utilized   care clustered   medication offered   pillow support provided   quiet environment facilitated  Intervention: Provide Person-Centered Care  Flowsheets (Taken 9/27/2022 0815)  Trust Relationship/Rapport:   care explained   questions encouraged     Problem: Adult Inpatient Plan of Care  Goal: Optimal Comfort and Wellbeing  Outcome: Ongoing, Progressing     Problem: Adult Inpatient Plan of  Care  Goal: Optimal Comfort and Wellbeing  Intervention: Monitor Pain and Promote Comfort  Flowsheets (Taken 9/27/2022 0815)  Pain Management Interventions:   around-the-clock dosing utilized   care clustered   medication offered   pillow support provided   quiet environment facilitated     Problem: Adult Inpatient Plan of Care  Goal: Optimal Comfort and Wellbeing  Intervention: Monitor Pain and Promote Comfort  Flowsheets (Taken 9/27/2022 0815)  Pain Management Interventions:   around-the-clock dosing utilized   care clustered   medication offered   pillow support provided   quiet environment facilitated     Problem: Adult Inpatient Plan of Care  Goal: Optimal Comfort and Wellbeing  Intervention: Provide Person-Centered Care  Flowsheets (Taken 9/27/2022 0815)  Trust Relationship/Rapport:   care explained   questions encouraged     Problem: Adult Inpatient Plan of Care  Goal: Optimal Comfort and Wellbeing  Intervention: Provide Person-Centered Care  Flowsheets (Taken 9/27/2022 0815)  Trust Relationship/Rapport:   care explained   questions encouraged     Problem: Adult Inpatient Plan of Care  Goal: Optimal Comfort and Wellbeing  Intervention: Provide Person-Centered Care  Flowsheets (Taken 9/27/2022 0815)  Trust Relationship/Rapport:   care explained   questions encouraged

## 2022-09-27 NOTE — CONSULTS
"[facility] Surg  Neurology  Consult Note    Patient Name: Jen March  MRN: [id number]  Admission Date: 2022  Hospital Length of Stay: 1 days  Code Status: Full Code   Attending Provider: Jean Pierre Infante MD   Consulting Provider: Luiz Weaver MD  Primary Care Physician: Primary Doctor No  Principal Problem:Uncontrolled seizures    Inpatient consult to Neurology  Consult performed by: Luiz Weaver MD  Consult ordered by: SARAH Bautista MD      Subjective:     Chief Complaint:  Seizures     HPI: 36 y/o female with Hx of seizures was transferred to [facility] for evaluation due to multiple seizures. Pt has apparently has several episodes of "staring off". Hx of obtain from her nephew who lives with her. During interview pt says "my brain is bad", "I need help" but does not answer questions appropriately. Pt is on levetiracetam 1000 mg BID and valproic acid 250 mg four times daily.     Past Medical History:   Diagnosis Date    Seizures        No past surgical history on file.    Review of patient's allergies indicates:  No Known Allergies    Current Neurological Medications:     Current Facility-Administered Medications on File Prior to Encounter   Medication    [COMPLETED] FOSphenytoin (CEREBYX) 1,135 mg PE in dextrose 5 % 100 mL IVPB    [] lorazepam (ATIVAN) 2 mg/mL injection     Current Outpatient Medications on File Prior to Encounter   Medication Sig    levETIRAcetam (KEPPRA) 500 MG Tab Take 1 tablet (500 mg total) by mouth 2 (two) times daily.    valproic acid (DEPAKENE) 250 mg capsule Take 1 capsule (250 mg total) by mouth 4 (four) times daily.      Family History       Problem Relation (Age of Onset)    Asthma Father    Diabetes Mother    Hypertension Mother          Tobacco Use    Smoking status: Never     Passive exposure: Never    Smokeless tobacco: Never   Substance and Sexual Activity    Alcohol use: Never    Drug use: Never    Sexual activity: Not Currently     Partners: " Male     Review of Systems   Constitutional:  Negative for fever.   HENT:  Negative for trouble swallowing.    Eyes:  Negative for photophobia.   Respiratory:  Negative for shortness of breath.    Cardiovascular:  Negative for chest pain.   Genitourinary:  Negative for flank pain.   Musculoskeletal:  Negative for back pain.   Neurological:  Negative for headaches.   Objective:     Vital Signs (Most Recent):  Temp: 98.4 °F (36.9 °C) (09/27/22 1206)  Pulse: 88 (09/27/22 1206)  Resp: 18 (09/27/22 1206)  BP: 104/70 (09/27/22 1206)  SpO2: 100 % (09/27/22 1206) Vital Signs (24h Range):  Temp:  [98 °F (36.7 °C)-98.4 °F (36.9 °C)] 98.4 °F (36.9 °C)  Pulse:  [76-90] 88  Resp:  [10-20] 18  SpO2:  [96 %-100 %] 100 %  BP: ()/(54-76) 104/70     Weight: 58.1 kg (128 lb 1.4 oz)  Body mass index is 26.77 kg/m².    Physical Exam  Constitutional:       General: She is not in acute distress.  HENT:      Head: Normocephalic.      Right Ear: External ear normal.      Left Ear: External ear normal.   Eyes:      General:         Right eye: No discharge.         Left eye: No discharge.   Cardiovascular:      Rate and Rhythm: Normal rate.   Pulmonary:      Breath sounds: Normal breath sounds.   Abdominal:      Palpations: Abdomen is soft.   Musculoskeletal:         General: No swelling.      Cervical back: Neck supple.   Skin:     Findings: No rash.   Neurological:      Motor: Motor strength is normal.   Psychiatric:         Speech: Speech normal.       NEUROLOGICAL EXAMINATION:     MENTAL STATUS   Speech: speech is normal   Level of consciousness: alert       Oriented to self, knows she is in a hospital     CRANIAL NERVES     CN III, IV, VI   Right pupil: Size: 3 mm. Shape: regular.   Left pupil: Size: 3 mm. Shape: regular.   Conjugate gaze: present    CN VII   Right facial weakness: none  Left facial weakness: none    CN XII   Tongue deviation: none    MOTOR EXAM     Strength   Strength 5/5 throughout.     Significant Labs: CBC:    Recent Labs   Lab 09/26/22  0831 09/27/22  0349 09/28/22  0441   WBC 6.19 6.10 5.89   HGB 12.9 13.0 13.0   HCT 37.4 37.8 37.1   * 114* 121*     CMP:   Recent Labs   Lab 09/26/22  0831 09/27/22  0349 09/28/22  0441   GLU 95 76 87    139 141   K 3.7 4.0 4.1    110 109   CO2 23 23 27   BUN 10 7 12   CREATININE 0.8 0.7 0.9   CALCIUM 9.1 9.0 8.9   MG  --  2.3 1.9   PROT 6.8 6.6 6.6   ALBUMIN 3.4* 3.4* 3.4*   BILITOT 0.4 0.4 0.3   ALKPHOS 48* 50* 53*   AST 18 17 13   ALT 7* 9* 7*   ANIONGAP 8 6* 5*     Urine Studies:   Recent Labs   Lab 09/26/22  1155   COLORU Yellow   APPEARANCEUA Clear   PHUR 7.0   SPECGRAV 1.010   PROTEINUA Negative   GLUCUA Negative   KETONESU Negative   BILIRUBINUA Negative   OCCULTUA 2+*   NITRITE Negative   LEUKOCYTESUR Negative   RBCUA 1   WBCUA 2   SQUAMEPITHEL 0       Significant Imaging: I have reviewed all pertinent imaging results/findings within the past 24 hours.    MRI brain  Impression:     Focal calcific densities medial right posterior frontal lobe cortex.  Larger more posterosuperior opacity show some suggestion of possible cavernoma deformity.  Additional calcifications left lateral parietal.     Prominence of subarachnoid pathways about cerebellar hemisphere discussed above.     MRI brain otherwise normal.        Electronically signed by: Julien Ng MD  Date:                                            09/27/2022  Time:                                           15:24    Assessment and Plan:     36 y/o female consulted for seizures    Epilepsy: history of such since childhood. Uncertain if pt is adherent to meds. EEG shows what appears to be an epileptogenic focus on left temporal area. MRI of brain as above. Several episodes in hospital.  Levetiracea, 1000 mg IV x 1 now then 1250 mg BID  Valproic acid 500  mg BID.   Level is 69. Monitor LFT's.  Lacosamide 200 mg IV x 1.  Monitor for side effects such as somnolence as result of increase in her  AED's.      Active Diagnoses:    Diagnosis Date Noted POA    PRINCIPAL PROBLEM:  Uncontrolled seizures [R56.9] 09/27/2022 Yes    Psychiatric symptoms [F99] 09/27/2022 Yes      Problems Resolved During this Admission:       VTE Risk Mitigation (From admission, onward)           Ordered     enoxaparin injection 40 mg  Daily         09/26/22 2333     Place sequential compression device  Until discontinued         09/26/22 2333     Place RAVI hose  Until discontinued         09/26/22 2333                    Thank you for your consult. I will follow-up with patient. Please contact us if you have any additional questions.    Luiz Weaver MD  Neurology  [facility] - [place], [place] - [facility] Surg

## 2022-09-27 NOTE — NURSING
Patient had two seizures while having an MRI. VS stable. Patient answering questions. PRN medicine given as ordered.  Dr. Infante and Dr. Weaver notified.

## 2022-09-27 NOTE — SUBJECTIVE & OBJECTIVE
Past Medical History:   Diagnosis Date    Seizures        No past surgical history on file.    Review of patient's allergies indicates:  No Known Allergies    Current Facility-Administered Medications on File Prior to Encounter   Medication    [COMPLETED] FOSphenytoin (CEREBYX) 1,135 mg PE in dextrose 5 % 100 mL IVPB    [COMPLETED] levETIRAcetam injection 1,000 mg    [] lorazepam (ATIVAN) 2 mg/mL injection    [COMPLETED] lorazepam (ATIVAN) injection 4 mg    [COMPLETED] sodium chloride 0.9% bolus 1,000 mL    [DISCONTINUED] levETIRAcetam in NaCl (iso-os) (KEPPRA) 1,000 mg/100 mL IVPB    [DISCONTINUED] sodium chloride 0.9% bolus 1,000 mL     Current Outpatient Medications on File Prior to Encounter   Medication Sig    levETIRAcetam (KEPPRA) 500 MG Tab Take 1 tablet (500 mg total) by mouth 2 (two) times daily.    valproic acid (DEPAKENE) 250 mg capsule Take 1 capsule (250 mg total) by mouth 4 (four) times daily.     Family History       Problem Relation (Age of Onset)    Asthma Father    Diabetes Mother    Hypertension Mother          Tobacco Use    Smoking status: Never     Passive exposure: Never    Smokeless tobacco: Never   Substance and Sexual Activity    Alcohol use: Never    Drug use: Never    Sexual activity: Not Currently     Partners: Male     Review of Systems   Constitutional:  Positive for activity change, appetite change and fatigue.   HENT:  Negative for congestion.    Eyes:  Negative for photophobia.   Respiratory:  Negative for shortness of breath.    Cardiovascular:  Positive for leg swelling.   Gastrointestinal:  Negative for diarrhea, nausea and vomiting.   Endocrine: Negative for polydipsia.   Genitourinary:  Negative for dysuria.   Musculoskeletal:  Positive for myalgias.   Skin:  Negative for rash.   Allergic/Immunologic: Negative for immunocompromised state.   Neurological:  Positive for seizures and headaches.   Hematological:  Negative for adenopathy.   Psychiatric/Behavioral:  Positive  for behavioral problems, decreased concentration, dysphoric mood and sleep disturbance.    Objective:     Vital Signs (Most Recent):  Temp: 98 °F (36.7 °C) (09/26/22 2300)  Pulse: 85 (09/26/22 2300)  Resp: 18 (09/26/22 2300)  BP: (!) 165/65 (09/26/22 2300)  SpO2: 100 % (09/26/22 2300) Vital Signs (24h Range):  Temp:  [97.7 °F (36.5 °C)-98 °F (36.7 °C)] 98 °F (36.7 °C)  Pulse:  [76-90] 85  Resp:  [10-22] 18  SpO2:  [96 %-100 %] 100 %  BP: ()/(54-78) 165/65     Weight: 58.1 kg (128 lb 1.4 oz)  Body mass index is 26.77 kg/m².    Physical Exam  Constitutional:       General: She is not in acute distress.     Appearance: She is overweight. She is not ill-appearing, toxic-appearing or diaphoretic.   HENT:      Head: Normocephalic and atraumatic.   Eyes:      Conjunctiva/sclera:      Right eye: Right conjunctiva is not injected.      Left eye: Left conjunctiva is not injected.   Neck:      Thyroid: No thyromegaly.   Cardiovascular:      Rate and Rhythm: Normal rate and regular rhythm.      Heart sounds:   No systolic murmur is present.   Pulmonary:      Effort: Pulmonary effort is normal.      Breath sounds: Normal breath sounds. No decreased breath sounds, wheezing, rhonchi or rales.   Chest:      Chest wall: No swelling or tenderness.   Abdominal:      General: Abdomen is protuberant. Bowel sounds are normal. There is no distension.      Tenderness: There is generalized abdominal tenderness.   Genitourinary:     Comments: No knight in place  Musculoskeletal:      Cervical back: Full passive range of motion without pain.   Lymphadenopathy:      Comments: No peripheral edema   Skin:     General: Skin is warm and dry.      Findings: No bruising.   Neurological:      General: No focal deficit present.      Mental Status: She is oriented to person, place, and time. She is lethargic.      GCS: GCS eye subscore is 4. GCS verbal subscore is 5. GCS motor subscore is 6.   Psychiatric:         Attention and Perception: She is  inattentive.         Mood and Affect: Mood is depressed. Affect is labile and flat.         Speech: Speech is delayed and tangential.         Behavior: Behavior is slowed and withdrawn.         Cognition and Memory: Cognition is not impaired. Memory is impaired. She exhibits impaired recent memory. She does not exhibit impaired remote memory.           Significant Labs: All pertinent labs within the past 24 hours have been reviewed.  Recent Results (from the past 24 hour(s))   CBC auto differential    Collection Time: 09/26/22  8:31 AM   Result Value Ref Range    WBC 6.19 3.90 - 12.70 K/uL    RBC 3.59 (L) 4.00 - 5.40 M/uL    Hemoglobin 12.9 12.0 - 16.0 g/dL    Hematocrit 37.4 37.0 - 48.5 %     (H) 82 - 98 fL    MCH 35.9 (H) 27.0 - 31.0 pg    MCHC 34.5 32.0 - 36.0 g/dL    RDW 11.9 11.5 - 14.5 %    Platelets 110 (L) 150 - 450 K/uL    MPV 11.0 9.2 - 12.9 fL    Immature Granulocytes 0.2 0.0 - 0.5 %    Gran # (ANC) 2.7 1.8 - 7.7 K/uL    Immature Grans (Abs) 0.01 0.00 - 0.04 K/uL    Lymph # 2.5 1.0 - 4.8 K/uL    Mono # 0.9 0.3 - 1.0 K/uL    Eos # 0.0 0.0 - 0.5 K/uL    Baso # 0.02 0.00 - 0.20 K/uL    nRBC 0 0 /100 WBC    Gran % 44.3 38.0 - 73.0 %    Lymph % 39.7 18.0 - 48.0 %    Mono % 15.0 4.0 - 15.0 %    Eosinophil % 0.5 0.0 - 8.0 %    Basophil % 0.3 0.0 - 1.9 %    Differential Method Automated    Comprehensive metabolic panel    Collection Time: 09/26/22  8:31 AM   Result Value Ref Range    Sodium 140 136 - 145 mmol/L    Potassium 3.7 3.5 - 5.1 mmol/L    Chloride 109 95 - 110 mmol/L    CO2 23 23 - 29 mmol/L    Glucose 95 70 - 110 mg/dL    BUN 10 6 - 20 mg/dL    Creatinine 0.8 0.5 - 1.4 mg/dL    Calcium 9.1 8.7 - 10.5 mg/dL    Total Protein 6.8 6.0 - 8.4 g/dL    Albumin 3.4 (L) 3.5 - 5.2 g/dL    Total Bilirubin 0.4 0.1 - 1.0 mg/dL    Alkaline Phosphatase 48 (L) 55 - 135 U/L    AST 18 10 - 40 U/L    ALT 7 (L) 10 - 44 U/L    Anion Gap 8 8 - 16 mmol/L    eGFR >60.0 >60 mL/min/1.73 m^2   Valproic Acid    Collection  Time: 09/26/22  8:31 AM   Result Value Ref Range    Valproic Acid Level 69.7 50.0 - 100.0 ug/mL   Ethanol    Collection Time: 09/26/22  8:31 AM   Result Value Ref Range    Alcohol, Serum <10 <10 mg/dL   Urinalysis, Reflex to Urine Culture Urine, Clean Catch    Collection Time: 09/26/22 11:55 AM    Specimen: Urine   Result Value Ref Range    Specimen UA Urine, Clean Catch     Color, UA Yellow Yellow, Straw, Jada    Appearance, UA Clear Clear    pH, UA 7.0 5.0 - 8.0    Specific Gravity, UA 1.010 1.005 - 1.030    Protein, UA Negative Negative    Glucose, UA Negative Negative    Ketones, UA Negative Negative    Bilirubin (UA) Negative Negative    Occult Blood UA 2+ (A) Negative    Nitrite, UA Negative Negative    Leukocytes, UA Negative Negative   Drug screen panel, emergency    Collection Time: 09/26/22 11:55 AM   Result Value Ref Range    Benzodiazepines Negative Negative    Methadone metabolites Negative Negative    Cocaine (Metab.) Negative Negative    Opiate Scrn, Ur Negative Negative    Barbiturate Screen, Ur Negative Negative    Amphetamine Screen, Ur Negative Negative    THC Negative Negative    Phencyclidine Negative Negative    Creatinine, Urine 73.5 15.0 - 325.0 mg/dL    Toxicology Information SEE COMMENT    Urinalysis Microscopic    Collection Time: 09/26/22 11:55 AM   Result Value Ref Range    RBC, UA 1 0 - 4 /hpf    WBC, UA 2 0 - 5 /hpf    Squam Epithel, UA 0 /hpf    Microscopic Comment SEE COMMENT    POCT urine pregnancy    Collection Time: 09/26/22 11:56 AM   Result Value Ref Range    POC Preg Test, Ur Negative Negative     Acceptable Yes    POCT COVID-19 Rapid Screening    Collection Time: 09/26/22  4:23 PM   Result Value Ref Range    POC Rapid COVID Negative Negative     Acceptable Yes          Significant Imaging: I have reviewed all pertinent imaging results/findings within the past 24 hours.

## 2022-09-28 LAB
ALBUMIN SERPL BCP-MCNC: 3.4 G/DL (ref 3.5–5.2)
ALP SERPL-CCNC: 53 U/L (ref 55–135)
ALT SERPL W/O P-5'-P-CCNC: 7 U/L (ref 10–44)
ANION GAP SERPL CALC-SCNC: 5 MMOL/L (ref 8–16)
AST SERPL-CCNC: 13 U/L (ref 10–40)
BASOPHILS # BLD AUTO: 0.02 K/UL (ref 0–0.2)
BASOPHILS NFR BLD: 0.3 % (ref 0–1.9)
BILIRUB SERPL-MCNC: 0.3 MG/DL (ref 0.1–1)
BUN SERPL-MCNC: 12 MG/DL (ref 6–20)
CALCIUM SERPL-MCNC: 8.9 MG/DL (ref 8.7–10.5)
CHLORIDE SERPL-SCNC: 109 MMOL/L (ref 95–110)
CK SERPL-CCNC: 42 U/L (ref 20–180)
CO2 SERPL-SCNC: 27 MMOL/L (ref 23–29)
CREAT SERPL-MCNC: 0.9 MG/DL (ref 0.5–1.4)
DIFFERENTIAL METHOD: ABNORMAL
EOSINOPHIL # BLD AUTO: 0.2 K/UL (ref 0–0.5)
EOSINOPHIL NFR BLD: 2.7 % (ref 0–8)
ERYTHROCYTE [DISTWIDTH] IN BLOOD BY AUTOMATED COUNT: 11.6 % (ref 11.5–14.5)
EST. GFR  (NO RACE VARIABLE): >60 ML/MIN/1.73 M^2
GLUCOSE SERPL-MCNC: 87 MG/DL (ref 70–110)
HCT VFR BLD AUTO: 37.1 % (ref 37–48.5)
HGB BLD-MCNC: 13 G/DL (ref 12–16)
IMM GRANULOCYTES # BLD AUTO: 0.02 K/UL (ref 0–0.04)
IMM GRANULOCYTES NFR BLD AUTO: 0.3 % (ref 0–0.5)
LYMPHOCYTES # BLD AUTO: 3.3 K/UL (ref 1–4.8)
LYMPHOCYTES NFR BLD: 55.5 % (ref 18–48)
MAGNESIUM SERPL-MCNC: 1.9 MG/DL (ref 1.6–2.6)
MCH RBC QN AUTO: 36.6 PG (ref 27–31)
MCHC RBC AUTO-ENTMCNC: 35 G/DL (ref 32–36)
MCV RBC AUTO: 105 FL (ref 82–98)
MONOCYTES # BLD AUTO: 0.6 K/UL (ref 0.3–1)
MONOCYTES NFR BLD: 9.8 % (ref 4–15)
NEUTROPHILS # BLD AUTO: 1.8 K/UL (ref 1.8–7.7)
NEUTROPHILS NFR BLD: 31.4 % (ref 38–73)
NRBC BLD-RTO: 0 /100 WBC
PHOSPHATE SERPL-MCNC: 4.6 MG/DL (ref 2.7–4.5)
PLATELET # BLD AUTO: 121 K/UL (ref 150–450)
PMV BLD AUTO: 11 FL (ref 9.2–12.9)
POTASSIUM SERPL-SCNC: 4.1 MMOL/L (ref 3.5–5.1)
PROT SERPL-MCNC: 6.6 G/DL (ref 6–8.4)
RBC # BLD AUTO: 3.55 M/UL (ref 4–5.4)
RPR SER QL: NORMAL
SODIUM SERPL-SCNC: 141 MMOL/L (ref 136–145)
WBC # BLD AUTO: 5.89 K/UL (ref 3.9–12.7)

## 2022-09-28 PROCEDURE — 63600175 PHARM REV CODE 636 W HCPCS: Performed by: PSYCHIATRY & NEUROLOGY

## 2022-09-28 PROCEDURE — 82550 ASSAY OF CK (CPK): CPT | Performed by: PSYCHIATRY & NEUROLOGY

## 2022-09-28 PROCEDURE — 36415 COLL VENOUS BLD VENIPUNCTURE: CPT | Performed by: INTERNAL MEDICINE

## 2022-09-28 PROCEDURE — 99232 SBSQ HOSP IP/OBS MODERATE 35: CPT | Mod: ,,, | Performed by: PSYCHIATRY & NEUROLOGY

## 2022-09-28 PROCEDURE — 80053 COMPREHEN METABOLIC PANEL: CPT | Performed by: INTERNAL MEDICINE

## 2022-09-28 PROCEDURE — 94761 N-INVAS EAR/PLS OXIMETRY MLT: CPT

## 2022-09-28 PROCEDURE — 25000003 PHARM REV CODE 250: Performed by: PSYCHIATRY & NEUROLOGY

## 2022-09-28 PROCEDURE — 84100 ASSAY OF PHOSPHORUS: CPT | Performed by: INTERNAL MEDICINE

## 2022-09-28 PROCEDURE — C9254 INJECTION, LACOSAMIDE: HCPCS | Performed by: PSYCHIATRY & NEUROLOGY

## 2022-09-28 PROCEDURE — 36415 COLL VENOUS BLD VENIPUNCTURE: CPT | Performed by: PSYCHIATRY & NEUROLOGY

## 2022-09-28 PROCEDURE — 83735 ASSAY OF MAGNESIUM: CPT | Performed by: INTERNAL MEDICINE

## 2022-09-28 PROCEDURE — 63600175 PHARM REV CODE 636 W HCPCS: Performed by: INTERNAL MEDICINE

## 2022-09-28 PROCEDURE — 99232 PR SUBSEQUENT HOSPITAL CARE,LEVL II: ICD-10-PCS | Mod: ,,, | Performed by: PSYCHIATRY & NEUROLOGY

## 2022-09-28 PROCEDURE — 85025 COMPLETE CBC W/AUTO DIFF WBC: CPT | Performed by: INTERNAL MEDICINE

## 2022-09-28 PROCEDURE — 25000003 PHARM REV CODE 250: Performed by: INTERNAL MEDICINE

## 2022-09-28 PROCEDURE — 11000001 HC ACUTE MED/SURG PRIVATE ROOM

## 2022-09-28 RX ORDER — LEVETIRACETAM 100 MG/ML
1250 SOLUTION ORAL 2 TIMES DAILY
Status: DISCONTINUED | OUTPATIENT
Start: 2022-09-28 | End: 2022-10-01

## 2022-09-28 RX ORDER — DIVALPROEX SODIUM 250 MG/1
500 TABLET, DELAYED RELEASE ORAL EVERY 12 HOURS
Status: DISCONTINUED | OUTPATIENT
Start: 2022-09-28 | End: 2022-10-02 | Stop reason: HOSPADM

## 2022-09-28 RX ADMIN — LEVETIRACETAM 1250 MG: 100 SOLUTION ORAL at 08:09

## 2022-09-28 RX ADMIN — ACETAMINOPHEN 650 MG: 325 TABLET ORAL at 08:09

## 2022-09-28 RX ADMIN — ENOXAPARIN SODIUM 40 MG: 100 INJECTION SUBCUTANEOUS at 04:09

## 2022-09-28 RX ADMIN — POLYETHYLENE GLYCOL 3350 17 G: 17 POWDER, FOR SOLUTION ORAL at 08:09

## 2022-09-28 RX ADMIN — ACETAMINOPHEN 650 MG: 325 TABLET ORAL at 02:09

## 2022-09-28 RX ADMIN — SODIUM CHLORIDE 100 MG: 9 INJECTION, SOLUTION INTRAVENOUS at 03:09

## 2022-09-28 RX ADMIN — DIVALPROEX SODIUM 500 MG: 250 TABLET, DELAYED RELEASE ORAL at 08:09

## 2022-09-28 NOTE — PLAN OF CARE
Problem: Adult Inpatient Plan of Care  Goal: Plan of Care Review  9/28/2022 1533 by Debbie Wasserman RN  Outcome: Ongoing, Progressing  Flowsheets (Taken 9/28/2022 0815)  Plan of Care Reviewed With: patient  9/28/2022 1530 by Debbie Wasserman RN  Outcome: Ongoing, Progressing  Goal: Patient-Specific Goal (Individualized)  Outcome: Ongoing, Progressing     Problem: Adult Inpatient Plan of Care  Goal: Plan of Care Review  9/28/2022 1533 by Debbie Wasserman RN  Outcome: Ongoing, Progressing  Flowsheets (Taken 9/28/2022 0815)  Plan of Care Reviewed With: patient  9/28/2022 1530 by Debbie Wasserman RN  Outcome: Ongoing, Progressing     Problem: Adult Inpatient Plan of Care  Goal: Plan of Care Review  9/28/2022 1533 by Debbie Wasserman RN  Outcome: Ongoing, Progressing  Flowsheets (Taken 9/28/2022 0815)  Plan of Care Reviewed With: patient     Problem: Adult Inpatient Plan of Care  Goal: Plan of Care Review  9/28/2022 1530 by Debbie Wasserman RN  Outcome: Ongoing, Progressing     Problem: Adult Inpatient Plan of Care  Goal: Patient-Specific Goal (Individualized)  Outcome: Ongoing, Progressing     Problem: Adult Inpatient Plan of Care  Goal: Patient-Specific Goal (Individualized)  Outcome: Ongoing, Progressing     Problem: Adult Inpatient Plan of Care  Goal: Absence of Hospital-Acquired Illness or Injury  Intervention: Identify and Manage Fall Risk  Flowsheets (Taken 9/28/2022 0815)  Safety Promotion/Fall Prevention:   bed alarm set   Fall Risk reviewed with patient/family   high risk medications identified   medications reviewed   side rails raised x 3  Intervention: Prevent Skin Injury  Flowsheets (Taken 9/28/2022 0815)  Body Position: position changed independently     Problem: Adult Inpatient Plan of Care  Goal: Absence of Hospital-Acquired Illness or Injury  Intervention: Identify and Manage Fall Risk  Flowsheets (Taken 9/28/2022 0815)  Safety Promotion/Fall Prevention:   bed alarm set   Fall Risk reviewed with patient/family   high risk  medications identified   medications reviewed   side rails raised x 3  Intervention: Prevent Skin Injury  Flowsheets (Taken 9/28/2022 0815)  Body Position: position changed independently     Problem: Adult Inpatient Plan of Care  Goal: Absence of Hospital-Acquired Illness or Injury  Intervention: Identify and Manage Fall Risk  Flowsheets (Taken 9/28/2022 0815)  Safety Promotion/Fall Prevention:   bed alarm set   Fall Risk reviewed with patient/family   high risk medications identified   medications reviewed   side rails raised x 3     Problem: Adult Inpatient Plan of Care  Goal: Absence of Hospital-Acquired Illness or Injury  Intervention: Identify and Manage Fall Risk  Flowsheets (Taken 9/28/2022 0815)  Safety Promotion/Fall Prevention:   bed alarm set   Fall Risk reviewed with patient/family   high risk medications identified   medications reviewed   side rails raised x 3     Problem: Adult Inpatient Plan of Care  Goal: Absence of Hospital-Acquired Illness or Injury  Intervention: Prevent Skin Injury  Flowsheets (Taken 9/28/2022 0815)  Body Position: position changed independently     Problem: Adult Inpatient Plan of Care  Goal: Absence of Hospital-Acquired Illness or Injury  Intervention: Prevent Skin Injury  Flowsheets (Taken 9/28/2022 0815)  Body Position: position changed independently

## 2022-09-28 NOTE — PROGRESS NOTES
"[facility] Medicine  Progress Note    Patient Name: Jen March  MRN: [id number]  Patient Class: IP- Inpatient   Admission Date: 9/26/2022  Length of Stay: 2 days  Attending Physician: Jean Pierre Infante MD  Primary Care Provider: Primary Doctor No        Subjective:     Principal Problem:Seizures        HPI:  Ms. Graves is a 38yo lady with a past medical history of seizure disorder.  She is on Keppra and Depakene at baseline.  Her family told the ED provider that she was due to get her Keppra filled today after completely running out (raising concerns for compliance).    Per sending MD, she was found unresponsive in her bed by her nephew this morning.  Her nephew also relayed that she had had a breakthrough seizure yesterday as well.  Her family then drove her to the ED at [facility].  She was confused on arrival with noted tremors and unresponsiveness from possible seizure activity.  They gave Ativan and the symptoms resolved.  After loading with the Keppra and Fosphenytoin below, she was still having some decreased level of awareness and periods of "staring off."    She tells me she is visiting family here from [place] for the past 3 days (this is not true, as she has seen the ED here in July for seizures and refills on medications).  She thinks she came to the hospital due to left calf pain going up her leg.  She is very vague and evasive when asking about her history of seizures.  She originally smiled and looked away, stating, "since I was young."  As I pinned her down exactly she finally volunteered "4 years."  She gets her seizure medicine Rx's from a doctor in [place].  She has an odd demeanor and states, "I've cried a lot since I was young."     In the ED her VS's were /61   Pulse 82   Temp 97.7 °F (36.5 °C) (Oral)   Resp 18   Ht 4' 10" (1.473 m)   Wt 56.7 kg (125 lb)   LMP 09/04/2022 (Exact Date)   SpO2 100%   Breastfeeding No   BMI 26.13 kg/m².   Labs showed " , Hg 12.9, Cr 0.8, otherwise normal CBC, CMP.  Valproate 69 ( normal), ETOH < 10, UPT neg, UDS negative.  COVID NEG.  UA negative for infection.     NC CT head showed no changes compared to CT head 09/08/2022 including the mild cerebellar atrophy and focal calcifications at the medial right frontal and lateral left posterior frontal parietal cortex.  EKG showed NSR rate 83, normal.      In the ED she was treated with Fosphenytoin 20mg/Kg (1135mg) IV 1251, Keppra 1g iv 0821, Ativan 4mg iv 0816, and NS 1LL bolus 0829.       Overview/Hospital Course:  No notes on file    Interval History: went in to see patient and patient convulsing lower extremities and arms and unresponsive. Gradually stopped and patient started to wake up. She had tears in her eyes, and tells me she is having back pain    Review of Systems   Musculoskeletal:  Positive for back pain.   Objective:     Vital Signs (Most Recent):  Temp: 98.1 °F (36.7 °C) (09/28/22 1249)  Pulse: 83 (09/28/22 1352)  Resp: 20 (09/28/22 1249)  BP: 101/64 (09/28/22 1352)  SpO2: 97 % (09/28/22 1352) Vital Signs (24h Range):  Temp:  [97.3 °F (36.3 °C)-98.4 °F (36.9 °C)] 98.1 °F (36.7 °C)  Pulse:  [] 83  Resp:  [16-20] 20  SpO2:  [97 %-100 %] 97 %  BP: ()/(55-78) 101/64     Weight: 58.1 kg (128 lb 1.4 oz)  Body mass index is 26.77 kg/m².    Intake/Output Summary (Last 24 hours) at 9/28/2022 1408  Last data filed at 9/28/2022 1403  Gross per 24 hour   Intake 789.92 ml   Output 500 ml   Net 289.92 ml      Physical Exam  Vitals and nursing note reviewed.   Constitutional:       General: She is not in acute distress.     Appearance: Normal appearance.   HENT:      Head: Normocephalic and atraumatic.      Right Ear: External ear normal.      Left Ear: External ear normal.      Nose: Nose normal. No congestion or rhinorrhea.      Mouth/Throat:      Mouth: Mucous membranes are dry.      Pharynx: No oropharyngeal exudate.   Eyes:      General: No scleral  icterus.     Conjunctiva/sclera: Conjunctivae normal.   Cardiovascular:      Rate and Rhythm: Normal rate and regular rhythm.      Pulses: Normal pulses.      Heart sounds: Normal heart sounds. No murmur heard.  Pulmonary:      Effort: Pulmonary effort is normal. No respiratory distress.      Breath sounds: Normal breath sounds. No wheezing, rhonchi or rales.   Abdominal:      General: Bowel sounds are normal. There is no distension.      Palpations: Abdomen is soft.      Tenderness: There is no abdominal tenderness. There is no guarding.   Musculoskeletal:         General: No swelling or tenderness. Normal range of motion.      Cervical back: Neck supple.      Right lower leg: No edema.      Left lower leg: No edema.   Lymphadenopathy:      Cervical: No cervical adenopathy.   Skin:     General: Skin is warm and dry.      Capillary Refill: Capillary refill takes less than 2 seconds.      Coloration: Skin is not jaundiced or pale.   Neurological:      Mental Status: She is alert.      Sensory: No sensory deficit.      Motor: No weakness.      Comments: Seen with body jerking, not responsive. This stopped and patient woke up and moving all extremities, tearful when told she had another seizure       Significant Labs: All pertinent labs within the past 24 hours have been reviewed.    Significant Imaging: I have reviewed all pertinent imaging results/findings within the past 24 hours.      Assessment/Plan:      * Seizures  - has long history of seizure disorder, recently seen by Neurology the week prior to admission  - on keppra and depakote, family states patient is compliant with meds  - she is still having seizure activity while in hospital though she is takin gher meds  - imaging noted and Neurology following  - adding vimpat      Psychiatric symptoms  - likely post ictal state        VTE Risk Mitigation (From admission, onward)         Ordered     enoxaparin injection 40 mg  Daily         09/26/22 [facility]     Place  sequential compression device  Until discontinued         09/26/22 2333     Place RAVI hose  Until discontinued         09/26/22 2333                Discharge Planning   VANESSA:      Code Status: Full Code   Is the patient medically ready for discharge?:     Reason for patient still in hospital (select all that apply): Treatment  Discharge Plan A: Home with family                  Jean Pierre Infante MD  Department of Hospital Medicine   [facility]

## 2022-09-28 NOTE — NURSING
Bed alarm sounded, patient trying to get OOB at the end of the bed. Patient had to use irvin. Assisted to pivot to BSC, gait unsteady, voided. Back to bed, bed alarm on, call light in reach, instructed to llamada para necesita. Needs reinforcement.

## 2022-09-28 NOTE — SUBJECTIVE & OBJECTIVE
Interval History: went in to see patient and patient convulsing lower extremities and arms and unresponsive. Gradually stopped and patient started to wake up. She had tears in her eyes, and tells me she is having back pain    Review of Systems   Musculoskeletal:  Positive for back pain.   Objective:     Vital Signs (Most Recent):  Temp: 98.1 °F (36.7 °C) (09/28/22 1249)  Pulse: 83 (09/28/22 1352)  Resp: 20 (09/28/22 1249)  BP: 101/64 (09/28/22 1352)  SpO2: 97 % (09/28/22 1352) Vital Signs (24h Range):  Temp:  [97.3 °F (36.3 °C)-98.4 °F (36.9 °C)] 98.1 °F (36.7 °C)  Pulse:  [] 83  Resp:  [16-20] 20  SpO2:  [97 %-100 %] 97 %  BP: ()/(55-78) 101/64     Weight: 58.1 kg (128 lb 1.4 oz)  Body mass index is 26.77 kg/m².    Intake/Output Summary (Last 24 hours) at 9/28/2022 1408  Last data filed at 9/28/2022 1403  Gross per 24 hour   Intake 789.92 ml   Output 500 ml   Net 289.92 ml      Physical Exam  Vitals and nursing note reviewed.   Constitutional:       General: She is not in acute distress.     Appearance: Normal appearance.   HENT:      Head: Normocephalic and atraumatic.      Right Ear: External ear normal.      Left Ear: External ear normal.      Nose: Nose normal. No congestion or rhinorrhea.      Mouth/Throat:      Mouth: Mucous membranes are dry.      Pharynx: No oropharyngeal exudate.   Eyes:      General: No scleral icterus.     Conjunctiva/sclera: Conjunctivae normal.   Cardiovascular:      Rate and Rhythm: Normal rate and regular rhythm.      Pulses: Normal pulses.      Heart sounds: Normal heart sounds. No murmur heard.  Pulmonary:      Effort: Pulmonary effort is normal. No respiratory distress.      Breath sounds: Normal breath sounds. No wheezing, rhonchi or rales.   Abdominal:      General: Bowel sounds are normal. There is no distension.      Palpations: Abdomen is soft.      Tenderness: There is no abdominal tenderness. There is no guarding.   Musculoskeletal:         General: No swelling  or tenderness. Normal range of motion.      Cervical back: Neck supple.      Right lower leg: No edema.      Left lower leg: No edema.   Lymphadenopathy:      Cervical: No cervical adenopathy.   Skin:     General: Skin is warm and dry.      Capillary Refill: Capillary refill takes less than 2 seconds.      Coloration: Skin is not jaundiced or pale.   Neurological:      Mental Status: She is alert.      Sensory: No sensory deficit.      Motor: No weakness.      Comments: Seen with body jerking, not responsive. This stopped and patient woke up and moving all extremities, tearful when told she had another seizure       Significant Labs: All pertinent labs within the past 24 hours have been reviewed.    Significant Imaging: I have reviewed all pertinent imaging results/findings within the past 24 hours.

## 2022-09-28 NOTE — NURSING
Used / ID [id number] to discuss meds, plan of care, safety, fall precautions and answers patients questions.

## 2022-09-28 NOTE — PLAN OF CARE
Patient is mostly awake, mostly alert before going to sleep and answers appropriately via . One episode of a deep sleep, aroused with manual stimulation, awaken to appropriateness. No seizures, no pain or SOB. Bed alarm on, telesitter in room; free of falls, call light in reach, room across from nurses station. Seizure precautions in place; nasal canula, suction set up and seizure pillows on the bed as ordered. Continue with plan of care as ordered. No distress noted  Problem: Adult Inpatient Plan of Care  Goal: Plan of Care Review  Outcome: Ongoing, Progressing     Problem: Adult Inpatient Plan of Care  Goal: Optimal Comfort and Wellbeing  Outcome: Ongoing, Progressing     Problem: Adult Inpatient Plan of Care  Goal: Readiness for Transition of Care  Outcome: Ongoing, Progressing     Problem: Adult Inpatient Plan of Care  Goal: Readiness for Transition of Care  Outcome: Ongoing, Progressing

## 2022-09-28 NOTE — ASSESSMENT & PLAN NOTE
- has long history of seizure disorder, recently seen by Neurology the week prior to admission  - on keppra and depakote, family states patient is compliant with meds  - she is still having seizure activity while in hospital though she is takin gher meds  - imaging noted and Neurology following  - adding vimpat

## 2022-09-28 NOTE — PLAN OF CARE
Recommendations    1) Continue Regular Diet as tolerated, encourage PO intake.  2) PO > 50%, Addition of Boost (+) TID to assist in meeting needs  3) Monitor Nutrition related labs     Goals:   1) Pt to meet adequate intake prior to d/c  Nutrition Goal Status: new  Communication of RD Recs:  (POC)

## 2022-09-28 NOTE — PROGRESS NOTES
"[facility] - Med Surg  Adult Nutrition  Progress Note    SUMMARY       Recommendations    1) Continue Regular Diet as tolerated, encourage PO intake.  2) PO > 50%, Addition of Boost (+) TID to assist in meeting needs  3) Monitor Nutrition related labs     Goals:   1) Pt to meet adequate intake prior to d/c  Nutrition Goal Status: new  Communication of RD Recs:  (POC)    Assessment and Plan  No Nutritional Diagnosis at this time.       Reason for Assessment    Reason For Assessment: identified at risk by screening criteria  Diagnosis: seizures  Relevant Medical History:   Patient Active Problem List   Diagnosis    Seizures    Psychiatric symptoms     General Information Comments:     9/28 - Pt presented to [facility] ED after seizure and being found unresponsive in bed by nephew, transferred to [facility]. Pt with continued active seizure activity though on medications. Current PO intake adequate at this time per flow sheets 75 - 100%. Weight hx with no recent indication of weight loss, no current indications of malnutrition. Continue to monitor PO intake given active seizures and still with somewhat altered mentation / postictal state.    Nutrition Discharge Planning: General Healthful Diet as tolerated    Nutrition Risk Screen    Nutrition Risk Screen: no indicators present    Nutrition/Diet History    Patient Reported Diet/Restrictions/Preferences: general  Food Allergies: NKFA  Factors Affecting Nutritional Intake: None identified at this time    Anthropometrics    Temp: 98.1 °F (36.7 °C)  Height Method: Stated  Height: 4' 10" (147.3 cm)  Height (inches): 58 in  Weight Method: Bed Scale  Weight: 58.1 kg (128 lb 1.4 oz)  Weight (lb): 128.09 lb  Ideal Body Weight (IBW), Female: 90 lb  % Ideal Body Weight, Female (lb): 142.32 %  BMI (Calculated): 26.8  BMI Grade: 25 - 29.9 - overweight       Lab/Procedures/Meds    Pertinent Labs Reviewed: reviewed  CBC:  Recent Labs   Lab 09/28/22  0441   WBC 5.89   HGB 13.0   HCT 37.1 "   *     CMP:  Recent Labs   Lab 09/28/22  0441   CALCIUM 8.9   ALBUMIN 3.4*   PROT 6.6      K 4.1   CO2 27      BUN 12   CREATININE 0.9   ALKPHOS 53*   ALT 7*   AST 13   BILITOT 0.3     Pertinent Medications Reviewed: reviewed  Scheduled Meds:   divalproex  500 mg Oral Q12H    enoxaparin  40 mg Subcutaneous Daily    lacosamide (VIMPAT) IVPB  100 mg Intravenous Q12H    levetiracetam  1,250 mg Oral BID    polyethylene glycol  17 g Oral Daily     Continuous Infusions:  PRN Meds:.acetaminophen, aluminum-magnesium hydroxide-simethicone, dextrose 50%, dextrose 50%, glucagon (human recombinant), glucose, glucose, influenza, LORazepam, melatonin, naloxone, ondansetron, prochlorperazine, senna-docusate 8.6-50 mg, simethicone, sodium chloride 0.9%    Estimated/Assessed Needs    Weight Used For Calorie Calculations: 58.1 kg (128 lb 1.4 oz)  Energy Calorie Requirements (kcal): 1453 - 1743  Energy Need Method: Kcal/kg (25 - 30)  Protein Requirements: 47 - 58 g (0.8 - 1.0 g/kg)  Weight Used For Protein Calculations: 58.1 kg (128 lb 1.4 oz)  Fluid Requirements (mL): 1 mL/kcal  Estimated Fluid Requirement Method:  (or per MD)  RDA Method (mL): 1453  CHO Requirement: 182g      Nutrition Prescription Ordered    Current Diet Order: Regular    Evaluation of Received Nutrient/Fluid Intake    I/O: + 1.1 L since admit  Energy Calories Required: meeting needs  Protein Required: meeting needs  Fluid Required: meeting needs  Comments: LBM 9/26  % Intake of Estimated Energy Needs: 75 - 100 %  % Meal Intake: 75 - 100 %    Intake/Output - Last 3 Shifts         09/26 0700 09/27 0659 09/27 0700 09/28 0659 09/28 0700 09/29 0659    P.O. 240 840 240    IV Piggyback  189.9     Total Intake(mL/kg) 240 (4.1) 1029.9 (17.7) 240 (4.1)    Urine (mL/kg/hr)  500 (0.4)     Stool  0     Total Output  500     Net +240 +529.9 +240           Urine Occurrence 1 x 5 x     Stool Occurrence 0 x 0 x 0 x            Nutrition Risk    Level of  Risk/Frequency of Follow-up:  (1 x /week)     Monitor and Evaluation    Food and Nutrient Intake: energy intake, food and beverage intake  Food and Nutrient Adminstration: diet order  Knowledge/Beliefs/Attitudes: beliefs and attitudes  Physical Activity and Function: nutrition-related ADLs and IADLs  Anthropometric Measurements: weight, weight change  Biochemical Data, Medical Tests and Procedures: electrolyte and renal panel, gastrointestinal profile, glucose/endocrine profile, inflammatory profile, lipid profile  Nutrition-Focused Physical Findings: overall appearance     Nutrition Follow-Up    RD Follow-up?: Yes  Lin Bond, BS, RDN, LDN

## 2022-09-29 PROCEDURE — 63600175 PHARM REV CODE 636 W HCPCS: Performed by: INTERNAL MEDICINE

## 2022-09-29 PROCEDURE — 25000003 PHARM REV CODE 250: Performed by: PSYCHIATRY & NEUROLOGY

## 2022-09-29 PROCEDURE — C9254 INJECTION, LACOSAMIDE: HCPCS | Performed by: PSYCHIATRY & NEUROLOGY

## 2022-09-29 PROCEDURE — 94761 N-INVAS EAR/PLS OXIMETRY MLT: CPT

## 2022-09-29 PROCEDURE — 25000003 PHARM REV CODE 250: Performed by: INTERNAL MEDICINE

## 2022-09-29 PROCEDURE — 63600175 PHARM REV CODE 636 W HCPCS: Performed by: PSYCHIATRY & NEUROLOGY

## 2022-09-29 PROCEDURE — 25000003 PHARM REV CODE 250: Performed by: NURSE PRACTITIONER

## 2022-09-29 PROCEDURE — 11000001 HC ACUTE MED/SURG PRIVATE ROOM

## 2022-09-29 RX ADMIN — LEVETIRACETAM 1250 MG: 100 SOLUTION ORAL at 08:09

## 2022-09-29 RX ADMIN — SODIUM CHLORIDE 1000 ML: 0.9 INJECTION, SOLUTION INTRAVENOUS at 08:09

## 2022-09-29 RX ADMIN — ACETAMINOPHEN 650 MG: 325 TABLET ORAL at 09:09

## 2022-09-29 RX ADMIN — ENOXAPARIN SODIUM 40 MG: 100 INJECTION SUBCUTANEOUS at 04:09

## 2022-09-29 RX ADMIN — DIVALPROEX SODIUM 500 MG: 250 TABLET, DELAYED RELEASE ORAL at 08:09

## 2022-09-29 RX ADMIN — POLYETHYLENE GLYCOL 3350 17 G: 17 POWDER, FOR SOLUTION ORAL at 09:09

## 2022-09-29 RX ADMIN — DIVALPROEX SODIUM 500 MG: 250 TABLET, DELAYED RELEASE ORAL at 09:09

## 2022-09-29 RX ADMIN — SODIUM CHLORIDE 100 MG: 9 INJECTION, SOLUTION INTRAVENOUS at 03:09

## 2022-09-29 RX ADMIN — LEVETIRACETAM 1250 MG: 100 SOLUTION ORAL at 09:09

## 2022-09-29 NOTE — NURSING
Pt with c/o pain to right chest/breast, tender to touch, sharp and deep. Dr. Infante notified, family at bedside, pt sitting up eating sandwich, Md will be at bedside soon.

## 2022-09-29 NOTE — HOSPITAL COURSE
"Admitted with recurrent seizures. Continued on home keppra and depakote. Neurology consulted. MRI and EEG done and noted:   "Focal calcific densities medial right posterior frontal lobe cortex.  Larger more posterosuperior opacity show some suggestion of possible cavernoma deformity.  Additional calcifications left lateral parietal. Prominence of subarachnoid pathways about cerebellar hemisphere discussed above. MRI brain otherwise normal." And "The presence of left fronto-temporal epileptiform discharges places the patient at risk for seizures arising from this region.  No seizures are seen during this record."  Patient has had multiple seizures despite being on medications, Vimpat added per Neurology.  Continued on treatment.  No further seizures overnight.  Patient unable to afford Vimpat.  Will discharge home on Keppra 1500 mg bid and Depakote 500 mg bid.  Patient will be referred to [facility].  "

## 2022-09-29 NOTE — PROGRESS NOTES
"[facility] Surg  Neurology  Progress Note    Patient Name: Jen March  MRN: [id number]  Admission Date: 9/26/2022  Hospital Length of Stay: 3 days  Code Status: Full Code   Attending Provider: Jean Pierre Infante MD  Primary Care Physician: Primary Doctor No   Principal Problem:Seizures    Subjective:     Interval History: 36 y/o female with Hx of seizures was transferred to [facility] for evaluation due to multiple seizures. Pt has apparently has several episodes of "staring off". Hx of obtain from her nephew who lives with her. During interview pt says "my brain is bad", "I need help" but does not answer questions appropriately. Pt is on levetiracetam 1000 mg BID and valproic acid 250 mg four times daily.     -9/28/22: A few seizures reported.    Current Neurological Medications:     Current Facility-Administered Medications   Medication Dose Route Frequency Provider Last Rate Last Admin    acetaminophen tablet 650 mg  650 mg Oral Q4H PRN W. Devante Bautista MD   650 mg at 09/28/22 1441    aluminum-magnesium hydroxide-simethicone 200-200-20 mg/5 mL suspension 30 mL  30 mL Oral QID PRN W. Devante Bautista MD        dextrose 50% injection 12.5 g  12.5 g Intravenous PRN SARAH Bautista MD        dextrose 50% injection 25 g  25 g Intravenous PRN WCitlalli Bautista MD        divalproex EC tablet 500 mg  500 mg Oral Q12H Luiz Weaver MD   500 mg at 09/28/22 2024    enoxaparin injection 40 mg  40 mg Subcutaneous Daily WCitlalli Bautista MD   40 mg at 09/28/22 1639    glucagon (human recombinant) injection 1 mg  1 mg Intramuscular PRN W. Devante Bautista MD        glucose chewable tablet 16 g  16 g Oral PRN SARAH Bautista MD        glucose chewable tablet 24 g  24 g Oral PRN WCitlalli Bautista MD        influenza (QUADRIVALENT PF) vaccine 0.5 mL  0.5 mL Intramuscular Prior to discharge Jean Pierre Infante MD        lacosamide (VIMPAT) 100 mg in sodium chloride 0.9% 100 mL IVPB  100 mg Intravenous Q12H Luiz Weaver MD   " Stopped at 09/29/22 0411    levetiracetam 500 mg/5 mL (5 mL) liquid Soln 1,250 mg  1,250 mg Oral BID Luiz Weaver MD   1,250 mg at 09/28/22 2024    LORazepam injection 2 mg  2 mg Intravenous Q4H PRN W. Devante Bautista MD   1 mg at 09/27/22 1342    melatonin tablet 6 mg  6 mg Oral Nightly PRN W. Devante Bautista MD        naloxone 0.4 mg/mL injection 0.02 mg  0.02 mg Intravenous PRN W. Devante Bautista MD        ondansetron injection 4 mg  4 mg Intravenous Q8H PRN W. Devante Bautista MD        polyethylene glycol packet 17 g  17 g Oral Daily W. Devante Bautista MD   17 g at 09/28/22 0855    prochlorperazine injection Soln 5 mg  5 mg Intravenous Q6H PRN W. Devante Bautista MD        senna-docusate 8.6-50 mg per tablet 1 tablet  1 tablet Oral BID PRN W. Devante Bautista MD        simethicone chewable tablet 80 mg  1 tablet Oral QID PRN W. Devante Bautista MD        sodium chloride 0.9% flush 10 mL  10 mL Intravenous Q12H PRN W. Devante Bautista MD           Review of Systems  Constitutional:  Negative for fever.   HENT:  Negative for trouble swallowing.    Eyes:  Negative for photophobia.   Respiratory:  Negative for shortness of breath.    Cardiovascular:  Negative for chest pain.   Genitourinary:  Negative for flank pain.   Musculoskeletal:  Negative for back pain.   Neurological:  Negative for headaches.   Psychiatric: Negative for hallucinations    Objective:     Vital Signs (Most Recent):  Temp: 97.4 °F (36.3 °C) (09/29/22 0407)  Pulse: 64 (09/29/22 0407)  Resp: 18 (09/29/22 0407)  BP: 102/63 (09/29/22 0407)  SpO2: 99 % (09/29/22 0407) Vital Signs (24h Range):  Temp:  [97.4 °F (36.3 °C)-98.3 °F (36.8 °C)] 97.4 °F (36.3 °C)  Pulse:  [64-99] 64  Resp:  [18-20] 18  SpO2:  [97 %-100 %] 99 %  BP: ()/(58-74) 102/63     Weight: 58.1 kg (128 lb 1.4 oz)  Body mass index is 26.77 kg/m².    Physical Exam  Constitutional:       General: She is not in acute distress.  HENT:      Head: Normocephalic.      Right Ear: External ear normal.       Left Ear: External ear normal.   Eyes:      General:         Right eye: No discharge.         Left eye: No discharge.   Cardiovascular:      Rate and Rhythm: Normal rate.   Pulmonary:      Breath sounds: Normal breath sounds.   Abdominal:      Palpations: Abdomen is soft.   Musculoskeletal:         General: No swelling.      Cervical back: Neck supple.   Skin:     Findings: No rash.   Neurological:      Motor: Motor strength is normal.   Psychiatric:         Speech: Speech normal.         NEUROLOGICAL EXAMINATION:      MENTAL STATUS   Speech: speech is normal   Level of consciousness: alert       Oriented to self, knows she is in a hospital      CRANIAL NERVES      CN III, IV, VI   Right pupil: Size: 3 mm. Shape: regular.   Left pupil: Size: 3 mm. Shape: regular.   Conjugate gaze: present     CN VII   Right facial weakness: none  Left facial weakness: none     CN XII   Tongue deviation: none     MOTOR EXAM      Strength   Strength 5/5 throughout.        Significant Labs: CBC:   Recent Labs   Lab 09/28/22  0441   WBC 5.89   HGB 13.0   HCT 37.1   *     CMP:   Recent Labs   Lab 09/28/22  0441   GLU 87      K 4.1      CO2 27   BUN 12   CREATININE 0.9   CALCIUM 8.9   MG 1.9   PROT 6.6   ALBUMIN 3.4*   BILITOT 0.3   ALKPHOS 53*   AST 13   ALT 7*   ANIONGAP 5*       Significant Imaging: I have reviewed all pertinent imaging results/findings within the past 24 hours.      Assessment and Plan:     38 y/o female consulted for seizures     Epilepsy: history of such since childhood. Uncertain if pt is adherent to meds. EEG shows what appears to be an epileptogenic focus on left temporal area. MRI of brain as above. Several episodes in hospital.  Levetiracetam 1250 mg BID  Valproic acid 500  mg BID.   Level is 69. Monitor LFT's.  Lacosamide 200 mg BID  Monitor for side effects such as somnolence as result of increase in her AED's.  Seizure restrictions are but not limited to: no driving for six months  after last seizure; avoid swimming, high altitude activities, operating heavy machinery, bathing unattended, or engaging in activities in where a seizure will cause harm to self or others.      Active Diagnoses:    Diagnosis Date Noted POA    PRINCIPAL PROBLEM:  Seizures [R56.9] 09/27/2022 Yes    Psychiatric symptoms [F99] 09/27/2022 Yes      Problems Resolved During this Admission:       VTE Risk Mitigation (From admission, onward)           Ordered     enoxaparin injection 40 mg  Daily         09/26/22 2333     Place sequential compression device  Until discontinued         09/26/22 2333     Place RAVI hose  Until discontinued         09/26/22 2333                    Luiz Weaver MD  Neurology  [facility] Surg

## 2022-09-29 NOTE — NURSING
Pt may have had a seizure. Family reported pt was lying in bed somnolent, was found by CNA in bed, hard to arouse, RN performed sternal rub, pt responsive and verbalizing after third sternal rub. Pt reports she was embarrassed that she urinated on herself and that she may have had a seizure. No tremors or noticeable sign of seizure activity noted from family. Dr. Infante notified, pt awake and alert at this time.

## 2022-09-29 NOTE — SUBJECTIVE & OBJECTIVE
Interval History: used  [id number] Talita during encounter, no further seizure episodes at that time but possibly later this afternoon.  Discussed with sister at bedside later this afternoon and they were concerned she had another seizure as she was difficult to arouse and urinated on herself.  They gave information that patient moved here 3 months ago due to her recurrent seizures.  They states she has been more child-like and having outbursts at times and then appears depressed as well.  They feel like her seizures occur more when she is not able to get out of the house and move around like she is usually able to.  They also state that her seizures are different each time.  Sometime she will convulse, sometimes she will pass out and be difficult to arouse.    Review of Systems   Musculoskeletal:  Positive for back pain.   Objective:     Vital Signs (Most Recent):  Temp: 98.4 °F (36.9 °C) (09/29/22 1154)  Pulse: 87 (09/29/22 1420)  Resp: 20 (09/29/22 1420)  BP: 107/71 (09/29/22 1420)  SpO2: 98 % (09/29/22 1420) Vital Signs (24h Range):  Temp:  [97.4 °F (36.3 °C)-98.4 °F (36.9 °C)] 98.4 °F (36.9 °C)  Pulse:  [64-99] 87  Resp:  [18-20] 20  SpO2:  [97 %-100 %] 98 %  BP: (100-129)/(58-71) 107/71     Weight: 58.1 kg (128 lb 1.4 oz)  Body mass index is 26.77 kg/m².    Intake/Output Summary (Last 24 hours) at 9/29/2022 1607  Last data filed at 9/29/2022 1403  Gross per 24 hour   Intake 684.52 ml   Output --   Net 684.52 ml        Physical Exam  Vitals and nursing note reviewed.   Constitutional:       General: She is not in acute distress.     Appearance: Normal appearance.   HENT:      Head: Normocephalic and atraumatic.      Right Ear: External ear normal.      Left Ear: External ear normal.      Nose: Nose normal. No congestion or rhinorrhea.      Mouth/Throat:      Mouth: Mucous membranes are dry.      Pharynx: No oropharyngeal exudate.   Eyes:      General: No scleral icterus.     Conjunctiva/sclera:  Conjunctivae normal.   Cardiovascular:      Rate and Rhythm: Normal rate and regular rhythm.      Pulses: Normal pulses.      Heart sounds: Normal heart sounds. No murmur heard.  Pulmonary:      Effort: Pulmonary effort is normal. No respiratory distress.      Breath sounds: Normal breath sounds. No wheezing, rhonchi or rales.   Abdominal:      General: Bowel sounds are normal. There is no distension.      Palpations: Abdomen is soft.      Tenderness: There is no abdominal tenderness. There is no guarding.   Musculoskeletal:         General: No swelling or tenderness. Normal range of motion.      Cervical back: Neck supple.      Right lower leg: No edema.      Left lower leg: No edema.   Lymphadenopathy:      Cervical: No cervical adenopathy.   Skin:     General: Skin is warm and dry.      Capillary Refill: Capillary refill takes less than 2 seconds.      Coloration: Skin is not jaundiced or pale.   Neurological:      Mental Status: She is alert.      Sensory: No sensory deficit.      Motor: No weakness.       Significant Labs: All pertinent labs within the past 24 hours have been reviewed.    Significant Imaging: I have reviewed all pertinent imaging results/findings within the past 24 hours.

## 2022-09-29 NOTE — PROGRESS NOTES
"[facility] Medicine  Progress Note    Patient Name: Jen March  MRN: [id number]  Patient Class: IP- Inpatient   Admission Date: 9/26/2022  Length of Stay: 3 days  Attending Physician: Jean Pierre Infante MD  Primary Care Provider: Primary Doctor No        Subjective:     Principal Problem:Seizures        HPI:  Ms. Graves is a 36yo lady with a past medical history of seizure disorder.  She is on Keppra and Depakene at baseline.  Her family told the ED provider that she was due to get her Keppra filled today after completely running out (raising concerns for compliance).    Per sending MD, she was found unresponsive in her bed by her nephew this morning.  Her nephew also relayed that she had had a breakthrough seizure yesterday as well.  Her family then drove her to the ED at [facility].  She was confused on arrival with noted tremors and unresponsiveness from possible seizure activity.  They gave Ativan and the symptoms resolved.  After loading with the Keppra and Fosphenytoin below, she was still having some decreased level of awareness and periods of "staring off."    She tells me she is visiting family here from [place] for the past 3 days (this is not true, as she has seen the ED here in July for seizures and refills on medications).  She thinks she came to the hospital due to left calf pain going up her leg.  She is very vague and evasive when asking about her history of seizures.  She originally smiled and looked away, stating, "since I was young."  As I pinned her down exactly she finally volunteered "4 years."  She gets her seizure medicine Rx's from a doctor in [place].  She has an odd demeanor and states, "I've cried a lot since I was young."     In the ED her VS's were /61   Pulse 82   Temp 97.7 °F (36.5 °C) (Oral)   Resp 18   Ht 4' 10" (1.473 m)   Wt 56.7 kg (125 lb)   LMP 09/04/2022 (Exact Date)   SpO2 100%   Breastfeeding No   BMI 26.13 kg/m².   Labs showed " ", Hg 12.9, Cr 0.8, otherwise normal CBC, CMP.  Valproate 69 ( normal), ETOH < 10, UPT neg, UDS negative.  COVID NEG.  UA negative for infection.     NC CT head showed no changes compared to CT head 09/08/2022 including the mild cerebellar atrophy and focal calcifications at the medial right frontal and lateral left posterior frontal parietal cortex.  EKG showed NSR rate 83, normal.      In the ED she was treated with Fosphenytoin 20mg/Kg (1135mg) IV 1251, Keppra 1g iv 0821, Ativan 4mg iv 0816, and NS 1LL bolus 0829.       Overview/Hospital Course:  Admitted with recurrent seizures. Continued on home keppra and depakote. Neurology consulted. MRI and EEG done and noted:   "Focal calcific densities medial right posterior frontal lobe cortex.  Larger more posterosuperior opacity show some suggestion of possible cavernoma deformity.  Additional calcifications left lateral parietal. Prominence of subarachnoid pathways about cerebellar hemisphere discussed above. MRI brain otherwise normal." And "The presence of left fronto-temporal epileptiform discharges places the patient at risk for seizures arising from this region.  No seizures are seen during this record."  Patient has had multiple seizures despite being on medications, Vimpat added per Neurology.      Interval History: used  [id number] Talita during encounter, no further seizure episodes at that time but possibly later this afternoon.  Discussed with sister at bedside later this afternoon and they were concerned she had another seizure as she was difficult to arouse and urinated on herself.  They gave information that patient moved here 3 months ago due to her recurrent seizures.  They states she has been more child-like and having outbursts at times and then appears depressed as well.  They feel like her seizures occur more when she is not able to get out of the house and move around like she is usually able to.  They also state that her " seizures are different each time.  Sometime she will convulse, sometimes she will pass out and be difficult to arouse.    Review of Systems   Musculoskeletal:  Positive for back pain.   Objective:     Vital Signs (Most Recent):  Temp: 98.4 °F (36.9 °C) (09/29/22 1154)  Pulse: 87 (09/29/22 1420)  Resp: 20 (09/29/22 1420)  BP: 107/71 (09/29/22 1420)  SpO2: 98 % (09/29/22 1420) Vital Signs (24h Range):  Temp:  [97.4 °F (36.3 °C)-98.4 °F (36.9 °C)] 98.4 °F (36.9 °C)  Pulse:  [64-99] 87  Resp:  [18-20] 20  SpO2:  [97 %-100 %] 98 %  BP: (100-129)/(58-71) 107/71     Weight: 58.1 kg (128 lb 1.4 oz)  Body mass index is 26.77 kg/m².    Intake/Output Summary (Last 24 hours) at 9/29/2022 1607  Last data filed at 9/29/2022 1403  Gross per 24 hour   Intake 684.52 ml   Output --   Net 684.52 ml        Physical Exam  Vitals and nursing note reviewed.   Constitutional:       General: She is not in acute distress.     Appearance: Normal appearance.   HENT:      Head: Normocephalic and atraumatic.      Right Ear: External ear normal.      Left Ear: External ear normal.      Nose: Nose normal. No congestion or rhinorrhea.      Mouth/Throat:      Mouth: Mucous membranes are dry.      Pharynx: No oropharyngeal exudate.   Eyes:      General: No scleral icterus.     Conjunctiva/sclera: Conjunctivae normal.   Cardiovascular:      Rate and Rhythm: Normal rate and regular rhythm.      Pulses: Normal pulses.      Heart sounds: Normal heart sounds. No murmur heard.  Pulmonary:      Effort: Pulmonary effort is normal. No respiratory distress.      Breath sounds: Normal breath sounds. No wheezing, rhonchi or rales.   Abdominal:      General: Bowel sounds are normal. There is no distension.      Palpations: Abdomen is soft.      Tenderness: There is no abdominal tenderness. There is no guarding.   Musculoskeletal:         General: No swelling or tenderness. Normal range of motion.      Cervical back: Neck supple.      Right lower leg: No edema.       Left lower leg: No edema.   Lymphadenopathy:      Cervical: No cervical adenopathy.   Skin:     General: Skin is warm and dry.      Capillary Refill: Capillary refill takes less than 2 seconds.      Coloration: Skin is not jaundiced or pale.   Neurological:      Mental Status: She is alert.      Sensory: No sensory deficit.      Motor: No weakness.       Significant Labs: All pertinent labs within the past 24 hours have been reviewed.    Significant Imaging: I have reviewed all pertinent imaging results/findings within the past 24 hours.      Assessment/Plan:      * Seizures  - has long history of seizure disorder, recently seen by Neurology the week prior to admission  - on keppra and depakote, family states patient is compliant with meds  - she is still having seizure activity while in hospital though she is takin gher meds  - imaging noted and Neurology following  - adding vimpat for now  - plan for EEG tomorrow      Psychiatric symptoms  - likely post ictal state        VTE Risk Mitigation (From admission, onward)         Ordered     enoxaparin injection 40 mg  Daily         09/26/22 2333     Place sequential compression device  Until discontinued         09/26/22 2333     Place RAVI hose  Until discontinued         09/26/22 2333                Discharge Planning   VANESSA:      Code Status: Full Code   Is the patient medically ready for discharge?:     Reason for patient still in hospital (select all that apply): Treatment  Discharge Plan A: Home with family                  Jean Pierre Infante MD  Department of Hospital Medicine   [facility]

## 2022-09-29 NOTE — ASSESSMENT & PLAN NOTE
- has long history of seizure disorder, recently seen by Neurology the week prior to admission  - on keppra and depakote, family states patient is compliant with meds  - she is still having seizure activity while in hospital though she is takin gher meds  - imaging noted and Neurology following  - adding vimpat for now  - plan for EEG tomorrow

## 2022-09-29 NOTE — PT/OT/SLP PROGRESS
Physical Therapy      Patient Name:  Jen March   MRN:  [id number]    Patient not seen today secondary to Patient unwilling to participate, Other (Comment) (c/o chest pain, nurse aware). Will follow-up tomorrow.

## 2022-09-30 PROBLEM — R53.81 DEBILITY: Status: ACTIVE | Noted: 2022-09-30

## 2022-09-30 LAB
PETH 16:0/18.1 (POPETH): <10 NG/ML
PETH 16:0/18.2 (PLPETH): <10 NG/ML

## 2022-09-30 PROCEDURE — 25000003 PHARM REV CODE 250: Performed by: INTERNAL MEDICINE

## 2022-09-30 PROCEDURE — 99232 SBSQ HOSP IP/OBS MODERATE 35: CPT | Mod: ,,, | Performed by: PSYCHIATRY & NEUROLOGY

## 2022-09-30 PROCEDURE — 97165 OT EVAL LOW COMPLEX 30 MIN: CPT

## 2022-09-30 PROCEDURE — C9254 INJECTION, LACOSAMIDE: HCPCS | Performed by: PSYCHIATRY & NEUROLOGY

## 2022-09-30 PROCEDURE — 63600175 PHARM REV CODE 636 W HCPCS: Performed by: PSYCHIATRY & NEUROLOGY

## 2022-09-30 PROCEDURE — 99232 PR SUBSEQUENT HOSPITAL CARE,LEVL II: ICD-10-PCS | Mod: ,,, | Performed by: PSYCHIATRY & NEUROLOGY

## 2022-09-30 PROCEDURE — 25000003 PHARM REV CODE 250: Performed by: PSYCHIATRY & NEUROLOGY

## 2022-09-30 PROCEDURE — 94760 N-INVAS EAR/PLS OXIMETRY 1: CPT

## 2022-09-30 PROCEDURE — 63600175 PHARM REV CODE 636 W HCPCS: Performed by: INTERNAL MEDICINE

## 2022-09-30 PROCEDURE — 11000001 HC ACUTE MED/SURG PRIVATE ROOM

## 2022-09-30 PROCEDURE — 97161 PT EVAL LOW COMPLEX 20 MIN: CPT

## 2022-09-30 PROCEDURE — 97535 SELF CARE MNGMENT TRAINING: CPT

## 2022-09-30 PROCEDURE — 99900035 HC TECH TIME PER 15 MIN (STAT)

## 2022-09-30 PROCEDURE — 97116 GAIT TRAINING THERAPY: CPT

## 2022-09-30 RX ADMIN — SODIUM CHLORIDE 200 MG: 9 INJECTION, SOLUTION INTRAVENOUS at 03:09

## 2022-09-30 RX ADMIN — ENOXAPARIN SODIUM 40 MG: 100 INJECTION SUBCUTANEOUS at 06:09

## 2022-09-30 RX ADMIN — ACETAMINOPHEN 650 MG: 325 TABLET ORAL at 02:09

## 2022-09-30 RX ADMIN — DIVALPROEX SODIUM 500 MG: 250 TABLET, DELAYED RELEASE ORAL at 08:09

## 2022-09-30 RX ADMIN — POLYETHYLENE GLYCOL 3350 17 G: 17 POWDER, FOR SOLUTION ORAL at 08:09

## 2022-09-30 RX ADMIN — LEVETIRACETAM 1250 MG: 100 SOLUTION ORAL at 08:09

## 2022-09-30 RX ADMIN — DIVALPROEX SODIUM 500 MG: 250 TABLET, DELAYED RELEASE ORAL at 09:09

## 2022-09-30 RX ADMIN — SODIUM CHLORIDE 200 MG: 9 INJECTION, SOLUTION INTRAVENOUS at 02:09

## 2022-09-30 RX ADMIN — LEVETIRACETAM 1250 MG: 100 SOLUTION ORAL at 09:09

## 2022-09-30 NOTE — PROGRESS NOTES
"[facility] [place] - [facility] Surg  Neurology  Progress Note    Patient Name: Jen March  MRN: [id number]  Admission Date: 9/26/2022  Hospital Length of Stay: 4 days  Code Status: Full Code   Attending Provider: Bakari Lopez MD  Primary Care Physician: Primary Doctor No   Principal Problem:Seizures    Subjective:     Interval History: 38 y/o female with Hx of seizures was transferred to [facility] for evaluation due to multiple seizures. Pt has apparently has several episodes of "staring off". Hx of obtain from her nephew who lives with her. During interview pt says "my brain is bad", "I need help" but does not answer questions appropriately. Pt is on levetiracetam 1000 mg BID and valproic acid 250 mg four times daily.      -9/28/22: A few seizures reported.    -9/30/22: No seizures today. Pt requesting to be discharge home.    Current Neurological Medications:     Current Facility-Administered Medications   Medication Dose Route Frequency Provider Last Rate Last Admin    acetaminophen tablet 650 mg  650 mg Oral Q4H PRN W. Devante Bautista MD   650 mg at 09/29/22 0929    aluminum-magnesium hydroxide-simethicone 200-200-20 mg/5 mL suspension 30 mL  30 mL Oral QID PRN W. Devante Bautista MD        dextrose 50% injection 12.5 g  12.5 g Intravenous PRN WCitlalli Bautista MD        dextrose 50% injection 25 g  25 g Intravenous PRN W. Devante Bautista MD        divalproex EC tablet 500 mg  500 mg Oral Q12H Luiz Weaver MD   500 mg at 09/30/22 0833    enoxaparin injection 40 mg  40 mg Subcutaneous Daily WCitlalli Bautista MD   40 mg at 09/29/22 1609    glucagon (human recombinant) injection 1 mg  1 mg Intramuscular PRN W. Devante Bautista MD        glucose chewable tablet 16 g  16 g Oral PRN WCitlalli Bautista MD        glucose chewable tablet 24 g  24 g Oral PRN WCitlalli Bautista MD        influenza (QUADRIVALENT PF) vaccine 0.5 mL  0.5 mL Intramuscular Prior to discharge Jean Pierre Infante MD        lacosamide (VIMPAT) 200 mg in sodium " chloride 0.9% 100 mL IVPB  200 mg Intravenous Q12H Luiz Weaver MD   Stopped at 09/30/22 0252    levetiracetam 500 mg/5 mL (5 mL) liquid Soln 1,250 mg  1,250 mg Oral BID Luiz Weaver MD   1,250 mg at 09/30/22 0831    LORazepam injection 2 mg  2 mg Intravenous Q4H PRN W. Devante Bautista MD   1 mg at 09/27/22 1342    melatonin tablet 6 mg  6 mg Oral Nightly PRN W. Devante Bautista MD        naloxone 0.4 mg/mL injection 0.02 mg  0.02 mg Intravenous PRN W. Devante Bautista MD        ondansetron injection 4 mg  4 mg Intravenous Q8H PRN W. Devante Bautista MD        polyethylene glycol packet 17 g  17 g Oral Daily W. Devante Bautista MD   17 g at 09/30/22 0833    prochlorperazine injection Soln 5 mg  5 mg Intravenous Q6H PRN W. Devante Bautista MD        senna-docusate 8.6-50 mg per tablet 1 tablet  1 tablet Oral BID PRN W. Devante Bautista MD        simethicone chewable tablet 80 mg  1 tablet Oral QID PRN W. Devante Bautista MD        sodium chloride 0.9% flush 10 mL  10 mL Intravenous Q12H PRN W. Devante Bautista MD           Review of Systems  Constitutional:  Negative for fever.   HENT:  Negative for trouble swallowing.    Eyes:  Negative for photophobia.   Respiratory:  Negative for shortness of breath.    Cardiovascular:  Negative for chest pain.   Genitourinary:  Negative for flank pain.   Musculoskeletal:  Negative for back pain.   Neurological:  Negative for headaches.   Psychiatric: Negative for hallucinations    Objective:     Vital Signs (Most Recent):  Temp: 98.1 °F (36.7 °C) (09/30/22 1047)  Pulse: 66 (09/30/22 1047)  Resp: 20 (09/30/22 1047)  BP: (!) 92/59 (09/30/22 1047)  SpO2: 98 % (09/30/22 1047)   Vital Signs (24h Range):  Temp:  [98 °F (36.7 °C)-98.7 °F (37.1 °C)] 98.1 °F (36.7 °C)  Pulse:  [66-81] 66  Resp:  [15-20] 20  SpO2:  [98 %-100 %] 98 %  BP: ()/(55-66) 92/59     Weight: 58.1 kg (128 lb 1.4 oz)  Body mass index is 26.77 kg/m².    Physical Exam  Constitutional:       General: She is not in acute  distress.  HENT:      Head: Normocephalic.      Right Ear: External ear normal.      Left Ear: External ear normal.   Eyes:      General:         Right eye: No discharge.         Left eye: No discharge.   Cardiovascular:      Rate and Rhythm: Normal rate.   Pulmonary:      Breath sounds: Normal breath sounds.   Abdominal:      Palpations: Abdomen is soft.   Musculoskeletal:         General: No swelling.      Cervical back: Neck supple.   Skin:     Findings: No rash.   Neurological:      Motor: Motor strength is normal.   Psychiatric:         Speech: Speech normal.         NEUROLOGICAL EXAMINATION:      MENTAL STATUS   Speech: speech is normal   Level of consciousness: alert       Oriented to self, knows she is in a hospital      CRANIAL NERVES      CN III, IV, VI   Right pupil: Size: 3 mm. Shape: regular.   Left pupil: Size: 3 mm. Shape: regular.   Conjugate gaze: present     CN VII   Right facial weakness: none  Left facial weakness: none     CN XII   Tongue deviation: none     MOTOR EXAM      Strength   Strength 5/5 throughout.        Significant Labs: CBC: No results for input(s): WBC, HGB, HCT, PLT in the last 48 hours.  CMP: No results for input(s): GLU, NA, K, CL, CO2, BUN, CREATININE, CALCIUM, MG, PROT, ALBUMIN, BILITOT, ALKPHOS, AST, ALT, ANIONGAP, EGFRNONAA in the last 48 hours.    Significant Imaging: I have reviewed all pertinent imaging results/findings within the past 24 hours.    Assessment and Plan:     38 y/o female consulted for seizures     Epilepsy: history of such since childhood. Uncertain if pt is adherent to meds. EEG shows what appears to be an epileptogenic focus on left temporal area. MRI of brain as above. Several episodes in hospital.  Levetiracetam 1250 mg BID  Valproic acid 500  mg BID.   Level is 69. Monitor LFT's.  Lacosamide 200 mg BID for now.  Monitor for side effects such as somnolence as result of increase in her AED's.  Try to avoid benzodiazepine every time pt has a  seizures unless event lasts longer then one minute or there are signs of respiratory distress, hypoxia, marked hypertension.  Seizure restrictions are but not limited to: no driving for six months after last seizure; avoid swimming, high altitude activities, operating heavy machinery, bathing unattended, or engaging in activities in where a seizure will cause harm to self or others.       Active Diagnoses:    Diagnosis Date Noted POA    PRINCIPAL PROBLEM:  Seizures [R56.9] 09/27/2022 Yes    Debility [R53.81] 09/30/2022 No    Psychiatric symptoms [F99] 09/27/2022 Yes      Problems Resolved During this Admission:       VTE Risk Mitigation (From admission, onward)           Ordered     enoxaparin injection 40 mg  Daily         09/26/22 2333     Place sequential compression device  Until discontinued         09/26/22 2333     Place RAVI hose  Until discontinued         09/26/22 2333                    Luiz Weaver MD  Neurology  [facility] [place] - [facility]

## 2022-09-30 NOTE — PT/OT/SLP EVAL
Physical Therapy Evaluation    Patient Name:  Jen March   MRN:  [id number]    Recommendations:     Discharge Recommendations:  home   Discharge Equipment Recommendations:  (TBD)     Assessment:     Jen March is a 37 y.o. female admitted with a medical diagnosis of Seizures.  She presents with the following impairments/functional limitations:  impaired functional mobility, gait instability, pain .    Rehab Prognosis: Fair; patient would benefit from acute skilled PT services to address these deficits and reach maximum level of function.    Recent Surgery: * No surgery found *      Plan:     During this hospitalization, patient to be seen 5 x/week to address the identified rehab impairments via gait training, therapeutic activities, therapeutic exercises and progress toward the following goals:    Plan of Care Expires:  10/07/22    Subjective     Chief Complaint: none  Patient/Family Comments/goals: Pt agreeable to therapy evals.  Pain/Comfort:  Pain Rating 1: 6/10  Location - Orientation 1: lower  Location 1: back  Pain Addressed 1: Reposition    Patients cultural, spiritual, Zoroastrianism conflicts given the current situation: no    Living Environment:  PTA pt lived with her sister, son and 2 nephews in a 1 story house with no steps to enter.  Prior to admission, patients level of function was independent.  Equipment used at home: none.  DME owned (not currently used): none.  Upon discharge, patient will have assistance from family.    Objective:     Communicated with nurseEvangelina prior to session.  Patient found supine with bed alarm, telemetry  upon PT entry to room.    General Precautions: Standard,     Orthopedic Precautions:N/A   Braces: N/A  Respiratory Status: Room air    Exams:  RLE ROM: WFL  RLE Strength: WFL  LLE ROM: WFL  LLE Strength: WFL    Functional Mobility:  Bed Mobility:     Supine to Sit: stand by assistance  Sit to Supine: stand by assistance  Transfers:     Sit  to Stand:  minimum assistance and of 2 persons with hand-held assist  Gait: 20' w/HHA x2.  Balance: Fair static/dynamic standing balance.    Therapeutic Activities and Exercises:   Educated on role of PT and POC.    AM-PAC 6 CLICK MOBILITY  Total Score:19     Patient left with bed in chair position with call button in reach and OT present.    GOALS:   Multidisciplinary Problems       Physical Therapy Goals          Problem: Physical Therapy    Goal Priority Disciplines Outcome Goal Variances Interventions   Physical Therapy Goal     PT, PT/OT Ongoing, Progressing     Description: Goals to be met by: 10/7/22     Patient will increase functional independence with mobility by performin. Pt to be mod I with bed mobility.  2. Pt to transfer with supervision.  3. Pt to ambulate 150' /c or /s AD and supervision.                         History:     Past Medical History:   Diagnosis Date    Seizures        No past surgical history on file.    Time Tracking:     PT Received On: 22  PT Start Time: 1048     PT Stop Time: 1111  PT Total Time (min): 23 min     Billable Minutes: Evaluation 15 and Gait Training 8      2022

## 2022-09-30 NOTE — PROGRESS NOTES
"[facility] Medicine  Progress Note    Patient Name: Jen March  MRN: [id number]  Patient Class: IP- Inpatient   Admission Date: 9/26/2022  Length of Stay: 4 days  Attending Physician: Bakari Lopez MD  Primary Care Provider: Primary Doctor No        Subjective:     Principal Problem:Seizures        HPI:  Ms. Graves is a 38yo lady with a past medical history of seizure disorder.  She is on Keppra and Depakene at baseline.  Her family told the ED provider that she was due to get her Keppra filled today after completely running out (raising concerns for compliance).    Per sending MD, she was found unresponsive in her bed by her nephew this morning.  Her nephew also relayed that she had had a breakthrough seizure yesterday as well.  Her family then drove her to the ED at [facility].  She was confused on arrival with noted tremors and unresponsiveness from possible seizure activity.  They gave Ativan and the symptoms resolved.  After loading with the Keppra and Fosphenytoin below, she was still having some decreased level of awareness and periods of "staring off."    She tells me she is visiting family here from [place] for the past 3 days (this is not true, as she has seen the ED here in July for seizures and refills on medications).  She thinks she came to the hospital due to left calf pain going up her leg.  She is very vague and evasive when asking about her history of seizures.  She originally smiled and looked away, stating, "since I was young."  As I pinned her down exactly she finally volunteered "4 years."  She gets her seizure medicine Rx's from a doctor in [place].  She has an odd demeanor and states, "I've cried a lot since I was young."     In the ED her VS's were /61   Pulse 82   Temp 97.7 °F (36.5 °C) (Oral)   Resp 18   Ht 4' 10" (1.473 m)   Wt 56.7 kg (125 lb)   LMP 09/04/2022 (Exact Date)   SpO2 100%   Breastfeeding No   BMI 26.13 kg/m².   Labs " "showed , Hg 12.9, Cr 0.8, otherwise normal CBC, CMP.  Valproate 69 ( normal), ETOH < 10, UPT neg, UDS negative.  COVID NEG.  UA negative for infection.     NC CT head showed no changes compared to CT head 09/08/2022 including the mild cerebellar atrophy and focal calcifications at the medial right frontal and lateral left posterior frontal parietal cortex.  EKG showed NSR rate 83, normal.      In the ED she was treated with Fosphenytoin 20mg/Kg (1135mg) IV 1251, Keppra 1g iv 0821, Ativan 4mg iv 0816, and NS 1LL bolus 0829.       Overview/Hospital Course:  Admitted with recurrent seizures. Continued on home keppra and depakote. Neurology consulted. MRI and EEG done and noted:   "Focal calcific densities medial right posterior frontal lobe cortex.  Larger more posterosuperior opacity show some suggestion of possible cavernoma deformity.  Additional calcifications left lateral parietal. Prominence of subarachnoid pathways about cerebellar hemisphere discussed above. MRI brain otherwise normal." And "The presence of left fronto-temporal epileptiform discharges places the patient at risk for seizures arising from this region.  No seizures are seen during this record."  Patient has had multiple seizures despite being on medications, Vimpat added per Neurology.      Interval History: feeling well and wants to go home.    Review of Systems   HENT:  Negative for ear discharge and ear pain.    Eyes:  Negative for discharge and itching.   Respiratory:  Negative for cough and shortness of breath.    Endocrine: Negative for cold intolerance and heat intolerance.   Objective:     Vital Signs (Most Recent):  Temp: 98.1 °F (36.7 °C) (09/30/22 1047)  Pulse: 66 (09/30/22 1047)  Resp: 20 (09/30/22 1047)  BP: (!) 92/59 (09/30/22 1047)  SpO2: 98 % (09/30/22 1047)   Vital Signs (24h Range):  Temp:  [98 °F (36.7 °C)-98.7 °F (37.1 °C)] 98.1 °F (36.7 °C)  Pulse:  [66-87] 66  Resp:  [15-20] 20  SpO2:  [98 %-100 %] 98 %  BP: " ()/(55-71) 92/59     Weight: 58.1 kg (128 lb 1.4 oz)  Body mass index is 26.77 kg/m².    Intake/Output Summary (Last 24 hours) at 9/30/2022 1310  Last data filed at 9/30/2022 0954  Gross per 24 hour   Intake 602.94 ml   Output --   Net 602.94 ml      Physical Exam  Vitals and nursing note reviewed.   Constitutional:       General: She is not in acute distress.     Appearance: Normal appearance.   HENT:      Head: Normocephalic and atraumatic.      Right Ear: External ear normal.      Left Ear: External ear normal.      Nose: Nose normal. No congestion or rhinorrhea.      Mouth/Throat:      Mouth: Mucous membranes are dry.      Pharynx: No oropharyngeal exudate.   Eyes:      General: No scleral icterus.     Conjunctiva/sclera: Conjunctivae normal.   Cardiovascular:      Rate and Rhythm: Normal rate and regular rhythm.      Pulses: Normal pulses.      Heart sounds: Normal heart sounds. No murmur heard.  Pulmonary:      Effort: Pulmonary effort is normal. No respiratory distress.      Breath sounds: Normal breath sounds. No wheezing, rhonchi or rales.   Abdominal:      General: Bowel sounds are normal. There is no distension.      Palpations: Abdomen is soft.      Tenderness: There is no abdominal tenderness. There is no guarding.   Musculoskeletal:         General: No swelling or tenderness. Normal range of motion.      Cervical back: Neck supple.      Right lower leg: No edema.      Left lower leg: No edema.   Lymphadenopathy:      Cervical: No cervical adenopathy.   Skin:     General: Skin is warm and dry.      Capillary Refill: Capillary refill takes less than 2 seconds.      Coloration: Skin is not jaundiced or pale.   Neurological:      Mental Status: She is alert.      Sensory: No sensory deficit.      Motor: No weakness.       Significant Labs: All pertinent labs within the past 24 hours have been reviewed.  BMP: No results for input(s): GLU, NA, K, CL, CO2, BUN, CREATININE, CALCIUM, MG in the last 48  hours.  CBC: No results for input(s): WBC, HGB, HCT, PLT in the last 48 hours.    Significant Imaging: I have reviewed all pertinent imaging results/findings within the past 24 hours.      Assessment/Plan:      * Seizures  - has long history of seizure disorder, recently seen by Neurology the week prior to admission  - on keppra and depakote, family states patient is compliant with meds  - imaging noted and Neurology following  - added vimpat for now  Will monitor overnight.  If no further issues, can go home tomorrow.      Debility  Very debilitated.  PT/OT consulted.  Out of bed bid.      Psychiatric symptoms  - likely post ictal state        VTE Risk Mitigation (From admission, onward)         Ordered     enoxaparin injection 40 mg  Daily         09/26/22 2333     Place sequential compression device  Until discontinued         09/26/22 2333     Place RAVI hose  Until discontinued         09/26/22 2333                Discharge Planning   VANESSA:      Code Status: Full Code   Is the patient medically ready for discharge?:     Reason for patient still in hospital (select all that apply): Patient trending condition  Discharge Plan A: Home with family                  Bakari Lopez MD  Department of Hospital Medicine   [facility]

## 2022-09-30 NOTE — SUBJECTIVE & OBJECTIVE
Interval History: feeling well and wants to go home.    Review of Systems   HENT:  Negative for ear discharge and ear pain.    Eyes:  Negative for discharge and itching.   Respiratory:  Negative for cough and shortness of breath.    Endocrine: Negative for cold intolerance and heat intolerance.   Objective:     Vital Signs (Most Recent):  Temp: 98.1 °F (36.7 °C) (09/30/22 1047)  Pulse: 66 (09/30/22 1047)  Resp: 20 (09/30/22 1047)  BP: (!) 92/59 (09/30/22 1047)  SpO2: 98 % (09/30/22 1047)   Vital Signs (24h Range):  Temp:  [98 °F (36.7 °C)-98.7 °F (37.1 °C)] 98.1 °F (36.7 °C)  Pulse:  [66-87] 66  Resp:  [15-20] 20  SpO2:  [98 %-100 %] 98 %  BP: ()/(55-71) 92/59     Weight: 58.1 kg (128 lb 1.4 oz)  Body mass index is 26.77 kg/m².    Intake/Output Summary (Last 24 hours) at 9/30/2022 1310  Last data filed at 9/30/2022 0954  Gross per 24 hour   Intake 602.94 ml   Output --   Net 602.94 ml      Physical Exam  Vitals and nursing note reviewed.   Constitutional:       General: She is not in acute distress.     Appearance: Normal appearance.   HENT:      Head: Normocephalic and atraumatic.      Right Ear: External ear normal.      Left Ear: External ear normal.      Nose: Nose normal. No congestion or rhinorrhea.      Mouth/Throat:      Mouth: Mucous membranes are dry.      Pharynx: No oropharyngeal exudate.   Eyes:      General: No scleral icterus.     Conjunctiva/sclera: Conjunctivae normal.   Cardiovascular:      Rate and Rhythm: Normal rate and regular rhythm.      Pulses: Normal pulses.      Heart sounds: Normal heart sounds. No murmur heard.  Pulmonary:      Effort: Pulmonary effort is normal. No respiratory distress.      Breath sounds: Normal breath sounds. No wheezing, rhonchi or rales.   Abdominal:      General: Bowel sounds are normal. There is no distension.      Palpations: Abdomen is soft.      Tenderness: There is no abdominal tenderness. There is no guarding.   Musculoskeletal:         General: No  swelling or tenderness. Normal range of motion.      Cervical back: Neck supple.      Right lower leg: No edema.      Left lower leg: No edema.   Lymphadenopathy:      Cervical: No cervical adenopathy.   Skin:     General: Skin is warm and dry.      Capillary Refill: Capillary refill takes less than 2 seconds.      Coloration: Skin is not jaundiced or pale.   Neurological:      Mental Status: She is alert.      Sensory: No sensory deficit.      Motor: No weakness.       Significant Labs: All pertinent labs within the past 24 hours have been reviewed.  BMP: No results for input(s): GLU, NA, K, CL, CO2, BUN, CREATININE, CALCIUM, MG in the last 48 hours.  CBC: No results for input(s): WBC, HGB, HCT, PLT in the last 48 hours.    Significant Imaging: I have reviewed all pertinent imaging results/findings within the past 24 hours.

## 2022-09-30 NOTE — PLAN OF CARE
Problem: Physical Therapy  Goal: Physical Therapy Goal  Description: Goals to be met by: 10/7/22     Patient will increase functional independence with mobility by performin. Pt to be mod I with bed mobility.  2. Pt to transfer with supervision.  3. Pt to ambulate 150' /c or /s AD and supervision.    Outcome: Ongoing, Progressing   Initial eval completed.  See in chart for details.

## 2022-09-30 NOTE — PLAN OF CARE
Problem: Occupational Therapy  Goal: Occupational Therapy Goal  Description: Goals to be met by: 10/14/2022     Patient will increase functional independence with ADLs by performing:    UE Dressing with Set-up Assistance.  LE Dressing with Minimal Assistance.  Grooming while seated with Set-up Assistance.  Toileting from toilet with Contact Guard Assistance for hygiene and clothing management.   Toilet transfer to toilet with Contact Guard Assistance.  Increased functional strength to 4/5 for self care and functional mobility.    Outcome: Ongoing, Progressing     Pt able to tolerate out of bed activity with assistance and will need functional assistance with out of bed activities from a caregiver.

## 2022-09-30 NOTE — ASSESSMENT & PLAN NOTE
- has long history of seizure disorder, recently seen by Neurology the week prior to admission  - on keppra and depakote, family states patient is compliant with meds  - imaging noted and Neurology following  - added vimpat for now  Will monitor overnight.  If no further issues, can go home tomorrow.

## 2022-09-30 NOTE — PT/OT/SLP EVAL
Occupational Therapy   Evaluation    Name: Jen March  MRN: [id number]  Admitting Diagnosis:  Seizures  Recent Surgery: * No surgery found *      Recommendations:     Discharge Recommendations: home health OT  Discharge Equipment Recommendations:  bedside commode (TBD but may need a assistive device for moblity to be evaluated by PT)  Barriers to discharge:   (Pt currently requires assistive for all out of bed activity)    Assessment:     Jen March is a 37 y.o. female with a medical diagnosis of Seizures.  She presents with odd behavior during OT evaluation as pt will stop abruptly when ambulating but when name called, pt reacts visually. Performance deficits affecting function: weakness, impaired self care skills, impaired balance, decreased coordination, decreased safety awareness, decreased ROM, impaired joint extensibility, impaired endurance, impaired functional mobility, decreased upper extremity function, impaired cognition, gait instability, decreased lower extremity function.      Rehab Prognosis: Fair; patient would benefit from acute skilled OT services to address these deficits and reach maximum level of function.       Plan:     Patient to be seen 3 x/week to address the above listed problems via self-care/home management, therapeutic activities, therapeutic exercises  Plan of Care Expires:    Plan of Care Reviewed with:      Subjective     Chief Complaint: pain in back  Patient/Family Comments/goals: To do for myself (CNA in room who helped with communication)    Occupational Profile:  Living Environment: House with no steps  Previous level of function: able to perform self care  Equipment Used at Home:  none  Assistance upon Discharge: sister, 2 nephews and son    Pain/Comfort:  Pain Rating 1: 6/10 (lower back)    Patients cultural, spiritual, Scientologist conflicts given the current situation:      Objective:     Communicated with: PT prior to session.  Patient found  supine with bed alarm, telemetry, peripheral IV upon OT entry to room.    General Precautions: Standard, fall, seizure (bed padding secondary too seizure precautions)   Orthopedic Precautions:N/A   Braces: N/A  Respiratory Status: Room air    Occupational Performance:    Bed Mobility:    Patient completed Supine to Sit with supervision  Patient completed Sit to Supine with supervision    Functional Mobility/Transfers:  Patient completed Sit <> Stand Transfer with supervision  with  hand-held assist   Functional Mobility: Pt able to complete oral care sitting edge of bed    Activities of Daily Living:  Feeding:  supervision edge of bed  Grooming: contact guard assistance with brushing teeth and face washing  Upper Body Dressing: minimum assistance with gestures to allow pt to understand    Cognitive/Visual Perceptual:  Cognitive/Psychosocial Skills:     -       Follows Commands/attention:Inattentive and Easily distracted    Physical Exam:  Postural examination/scapula alignment:    -       Rounded shoulders  -       Forward head  Upper Extremity Range of Motion:     -       Right Upper Extremity: WFL  -       Left Upper Extremity : WFL    AMPAC 6 Click ADL:  AMPAC Total Score: 21    Treatment & Education:  -Pt received yellow theraband and performed 3 sets of 10 of the following  Shoulder flexion  Abduciton  Adduction    Patient left  safety paddings in place  with all lines intact, call button in reach, bed alarm on, and mehrdad notified    GOALS:   Multidisciplinary Problems       Occupational Therapy Goals          Problem: Occupational Therapy    Goal Priority Disciplines Outcome Interventions   Occupational Therapy Goal     OT, PT/OT Ongoing, Progressing    Description: Goals to be met by: 10/14/2022     Patient will increase functional independence with ADLs by performing:    UE Dressing with Set-up Assistance.  LE Dressing with Minimal Assistance.  Grooming while seated with Set-up Assistance.  Toileting from  toilet with Contact Guard Assistance for hygiene and clothing management.   Toilet transfer to toilet with Contact Guard Assistance.  Increased functional strength to 4/5 for self care and functional mobility.                         History:     Past Medical History:   Diagnosis Date    Seizures        No past surgical history on file.    Time Tracking:     OT Date of Treatment: 09/30/22  OT Start Time: 1048  OT Stop Time: 1117  OT Total Time (min): 29 min    Billable Minutes:Evaluation 15 co treatment with PT for evaluaiton  Self Care/Home Management 14    9/30/2022

## 2022-09-30 NOTE — PLAN OF CARE
Pt remained free from falls and injury this shift, no seizure activity noted, remains on seizure precautions. No distress noted.   Problem: Adult Inpatient Plan of Care  Goal: Plan of Care Review  9/30/2022 1832 by Evangelina Moon RN  Outcome: Ongoing, Progressing  9/30/2022 1832 by Evangelina Moon RN  Outcome: Ongoing, Progressing  Goal: Patient-Specific Goal (Individualized)  9/30/2022 1832 by Evangelina Moon RN  Outcome: Ongoing, Progressing  9/30/2022 1832 by Evangelina Moon RN  Outcome: Ongoing, Progressing  Goal: Absence of Hospital-Acquired Illness or Injury  9/30/2022 1832 by Evangelina Moon RN  Outcome: Ongoing, Progressing  9/30/2022 1832 by Evangelina Moon RN  Outcome: Ongoing, Progressing  Goal: Optimal Comfort and Wellbeing  9/30/2022 1832 by Evangelina Moon RN  Outcome: Ongoing, Progressing  9/30/2022 1832 by Evangelina Moon RN  Outcome: Ongoing, Progressing  Goal: Readiness for Transition of Care  9/30/2022 1832 by Evangelina Moon RN  Outcome: Ongoing, Progressing  9/30/2022 1832 by Evangelina Moon RN  Outcome: Ongoing, Progressing     Problem: Skin Injury Risk Increased  Goal: Skin Health and Integrity  9/30/2022 1832 by Evangelina Moon RN  Outcome: Ongoing, Progressing  9/30/2022 1832 by Evangelina Moon RN  Outcome: Ongoing, Progressing     Problem: Seizure, Active Management  Goal: Absence of Seizure/Seizure-Related Injury  Outcome: Ongoing, Progressing

## 2022-10-01 LAB — VALPROATE SERPL-MCNC: 63.4 UG/ML (ref 50–100)

## 2022-10-01 PROCEDURE — C9254 INJECTION, LACOSAMIDE: HCPCS | Performed by: PSYCHIATRY & NEUROLOGY

## 2022-10-01 PROCEDURE — 99232 SBSQ HOSP IP/OBS MODERATE 35: CPT | Mod: ,,, | Performed by: PSYCHIATRY & NEUROLOGY

## 2022-10-01 PROCEDURE — 99232 PR SUBSEQUENT HOSPITAL CARE,LEVL II: ICD-10-PCS | Mod: ,,, | Performed by: PSYCHIATRY & NEUROLOGY

## 2022-10-01 PROCEDURE — 25000003 PHARM REV CODE 250: Performed by: INTERNAL MEDICINE

## 2022-10-01 PROCEDURE — 63600175 PHARM REV CODE 636 W HCPCS: Performed by: PSYCHIATRY & NEUROLOGY

## 2022-10-01 PROCEDURE — 63600175 PHARM REV CODE 636 W HCPCS: Performed by: INTERNAL MEDICINE

## 2022-10-01 PROCEDURE — 36415 COLL VENOUS BLD VENIPUNCTURE: CPT | Performed by: PSYCHIATRY & NEUROLOGY

## 2022-10-01 PROCEDURE — 25000003 PHARM REV CODE 250: Performed by: PSYCHIATRY & NEUROLOGY

## 2022-10-01 PROCEDURE — 11000001 HC ACUTE MED/SURG PRIVATE ROOM

## 2022-10-01 PROCEDURE — 80164 ASSAY DIPROPYLACETIC ACD TOT: CPT | Performed by: PSYCHIATRY & NEUROLOGY

## 2022-10-01 RX ORDER — LEVETIRACETAM 100 MG/ML
1500 SOLUTION ORAL 2 TIMES DAILY
Status: DISCONTINUED | OUTPATIENT
Start: 2022-10-01 | End: 2022-10-02 | Stop reason: HOSPADM

## 2022-10-01 RX ADMIN — SODIUM CHLORIDE 200 MG: 9 INJECTION, SOLUTION INTRAVENOUS at 02:10

## 2022-10-01 RX ADMIN — DIVALPROEX SODIUM 500 MG: 250 TABLET, DELAYED RELEASE ORAL at 08:10

## 2022-10-01 RX ADMIN — DIVALPROEX SODIUM 500 MG: 250 TABLET, DELAYED RELEASE ORAL at 09:10

## 2022-10-01 RX ADMIN — LORAZEPAM 2 MG: 2 INJECTION INTRAMUSCULAR; INTRAVENOUS at 03:10

## 2022-10-01 RX ADMIN — POLYETHYLENE GLYCOL 3350 17 G: 17 POWDER, FOR SOLUTION ORAL at 09:10

## 2022-10-01 RX ADMIN — LEVETIRACETAM 1500 MG: 100 SOLUTION ORAL at 09:10

## 2022-10-01 RX ADMIN — ENOXAPARIN SODIUM 40 MG: 100 INJECTION SUBCUTANEOUS at 05:10

## 2022-10-01 RX ADMIN — LEVETIRACETAM 1250 MG: 100 SOLUTION ORAL at 08:10

## 2022-10-01 NOTE — PLAN OF CARE
Problem: Adult Inpatient Plan of Care  Goal: Plan of Care Review  Outcome: Ongoing, Progressing  Flowsheets (Taken 10/1/2022 0815)  Plan of Care Reviewed With: patient  Goal: Patient-Specific Goal (Individualized)  Outcome: Ongoing, Progressing     Problem: Seizure, Active Management  Goal: Absence of Seizure/Seizure-Related Injury  Outcome: Ongoing, Progressing  Intervention: Prevent Seizure-Related Injury  Flowsheets (Taken 10/1/2022 0815)  Seizure Precautions:   activity supervised   clutter-free environment maintained   emergency equipment at bedside     Problem: Adult Inpatient Plan of Care  Goal: Plan of Care Review  Outcome: Ongoing, Progressing  Flowsheets (Taken 10/1/2022 0815)  Plan of Care Reviewed With: patient     Problem: Adult Inpatient Plan of Care  Goal: Plan of Care Review  Outcome: Ongoing, Progressing  Flowsheets (Taken 10/1/2022 0815)  Plan of Care Reviewed With: patient     Problem: Adult Inpatient Plan of Care  Goal: Patient-Specific Goal (Individualized)  Outcome: Ongoing, Progressing     Problem: Adult Inpatient Plan of Care  Goal: Patient-Specific Goal (Individualized)  Outcome: Ongoing, Progressing     Problem: Seizure, Active Management  Goal: Absence of Seizure/Seizure-Related Injury  Outcome: Ongoing, Progressing  Intervention: Prevent Seizure-Related Injury  Flowsheets (Taken 10/1/2022 0815)  Seizure Precautions:   activity supervised   clutter-free environment maintained   emergency equipment at bedside     Problem: Seizure, Active Management  Goal: Absence of Seizure/Seizure-Related Injury  Outcome: Ongoing, Progressing     Problem: Seizure, Active Management  Goal: Absence of Seizure/Seizure-Related Injury  Intervention: Prevent Seizure-Related Injury  Flowsheets (Taken 10/1/2022 0815)  Seizure Precautions:   activity supervised   clutter-free environment maintained   emergency equipment at bedside     Problem: Seizure, Active Management  Goal: Absence of Seizure/Seizure-Related  Injury  Intervention: Prevent Seizure-Related Injury  Flowsheets (Taken 10/1/2022 9815)  Seizure Precautions:   activity supervised   clutter-free environment maintained   emergency equipment at bedside

## 2022-10-01 NOTE — ASSESSMENT & PLAN NOTE
- has long history of seizure disorder, recently seen by Neurology the week prior to admission  - on keppra and depakote, family states patient is compliant with meds  - imaging noted and Neurology following  - added vimpat for now  Another seizure overnight.  Will discuss plan of treatment with Neuro.

## 2022-10-01 NOTE — PROGRESS NOTES
"[facility] Medicine  Progress Note    Patient Name: Jen March  MRN: [id number]  Patient Class: IP- Inpatient   Admission Date: 9/26/2022  Length of Stay: 5 days  Attending Physician: Bakari Lopez MD  Primary Care Provider: Primary Doctor No        Subjective:     Principal Problem:Seizures        HPI:  Ms. Graves is a 36yo lady with a past medical history of seizure disorder.  She is on Keppra and Depakene at baseline.  Her family told the ED provider that she was due to get her Keppra filled today after completely running out (raising concerns for compliance).    Per sending MD, she was found unresponsive in her bed by her nephew this morning.  Her nephew also relayed that she had had a breakthrough seizure yesterday as well.  Her family then drove her to the ED at [facility].  She was confused on arrival with noted tremors and unresponsiveness from possible seizure activity.  They gave Ativan and the symptoms resolved.  After loading with the Keppra and Fosphenytoin below, she was still having some decreased level of awareness and periods of "staring off."    She tells me she is visiting family here from [place] for the past 3 days (this is not true, as she has seen the ED here in July for seizures and refills on medications).  She thinks she came to the hospital due to left calf pain going up her leg.  She is very vague and evasive when asking about her history of seizures.  She originally smiled and looked away, stating, "since I was young."  As I pinned her down exactly she finally volunteered "4 years."  She gets her seizure medicine Rx's from a doctor in [place].  She has an odd demeanor and states, "I've cried a lot since I was young."     In the ED her VS's were /61   Pulse 82   Temp 97.7 °F (36.5 °C) (Oral)   Resp 18   Ht 4' 10" (1.473 m)   Wt 56.7 kg (125 lb)   LMP 09/04/2022 (Exact Date)   SpO2 100%   Breastfeeding No   BMI 26.13 kg/m².   Labs " "showed , Hg 12.9, Cr 0.8, otherwise normal CBC, CMP.  Valproate 69 ( normal), ETOH < 10, UPT neg, UDS negative.  COVID NEG.  UA negative for infection.     NC CT head showed no changes compared to CT head 09/08/2022 including the mild cerebellar atrophy and focal calcifications at the medial right frontal and lateral left posterior frontal parietal cortex.  EKG showed NSR rate 83, normal.      In the ED she was treated with Fosphenytoin 20mg/Kg (1135mg) IV 1251, Keppra 1g iv 0821, Ativan 4mg iv 0816, and NS 1LL bolus 0829.       Overview/Hospital Course:  Admitted with recurrent seizures. Continued on home keppra and depakote. Neurology consulted. MRI and EEG done and noted:   "Focal calcific densities medial right posterior frontal lobe cortex.  Larger more posterosuperior opacity show some suggestion of possible cavernoma deformity.  Additional calcifications left lateral parietal. Prominence of subarachnoid pathways about cerebellar hemisphere discussed above. MRI brain otherwise normal." And "The presence of left fronto-temporal epileptiform discharges places the patient at risk for seizures arising from this region.  No seizures are seen during this record."  Patient has had multiple seizures despite being on medications, Vimpat added per Neurology.      Interval History: another seizure overnight.    Review of Systems   HENT:  Negative for ear discharge and ear pain.    Eyes:  Negative for discharge and itching.   Respiratory:  Negative for cough and shortness of breath.    Endocrine: Negative for cold intolerance and heat intolerance.   Objective:     Vital Signs (Most Recent):  Temp: 98.4 °F (36.9 °C) (10/01/22 1052)  Pulse: 68 (10/01/22 1052)  Resp: 18 (10/01/22 1052)  BP: 95/73 (10/01/22 1052)  SpO2: 99 % (10/01/22 1052)   Vital Signs (24h Range):  Temp:  [97.4 °F (36.3 °C)-98.6 °F (37 °C)] 98.4 °F (36.9 °C)  Pulse:  [60-79] 68  Resp:  [16-19] 18  SpO2:  [97 %-100 %] 99 %  BP: ()/(52-77) " 95/73     Weight: 58.1 kg (128 lb 1.4 oz)  Body mass index is 26.77 kg/m².    Intake/Output Summary (Last 24 hours) at 10/1/2022 1305  Last data filed at 10/1/2022 1304  Gross per 24 hour   Intake 697.31 ml   Output 300 ml   Net 397.31 ml        Physical Exam  Vitals and nursing note reviewed.   Constitutional:       General: She is not in acute distress.     Appearance: Normal appearance.   HENT:      Head: Normocephalic and atraumatic.      Right Ear: External ear normal.      Left Ear: External ear normal.      Nose: Nose normal. No congestion or rhinorrhea.      Mouth/Throat:      Mouth: Mucous membranes are dry.      Pharynx: No oropharyngeal exudate.   Eyes:      General: No scleral icterus.     Conjunctiva/sclera: Conjunctivae normal.   Cardiovascular:      Rate and Rhythm: Normal rate and regular rhythm.      Pulses: Normal pulses.      Heart sounds: Normal heart sounds. No murmur heard.  Pulmonary:      Effort: Pulmonary effort is normal. No respiratory distress.      Breath sounds: Normal breath sounds. No wheezing, rhonchi or rales.   Abdominal:      General: Bowel sounds are normal. There is no distension.      Palpations: Abdomen is soft.      Tenderness: There is no abdominal tenderness. There is no guarding.   Musculoskeletal:         General: No swelling or tenderness. Normal range of motion.      Cervical back: Neck supple.      Right lower leg: No edema.      Left lower leg: No edema.   Lymphadenopathy:      Cervical: No cervical adenopathy.   Skin:     General: Skin is warm and dry.      Capillary Refill: Capillary refill takes less than 2 seconds.      Coloration: Skin is not jaundiced or pale.   Neurological:      Mental Status: She is alert.      Sensory: No sensory deficit.      Motor: No weakness.       Significant Labs: All pertinent labs within the past 24 hours have been reviewed.  BMP: No results for input(s): GLU, NA, K, CL, CO2, BUN, CREATININE, CALCIUM, MG in the last 48 hours.  CBC:  No results for input(s): WBC, HGB, HCT, PLT in the last 48 hours.    Significant Imaging: I have reviewed all pertinent imaging results/findings within the past 24 hours.      Assessment/Plan:      * Seizures  - has long history of seizure disorder, recently seen by Neurology the week prior to admission  - on keppra and depakote, family states patient is compliant with meds  - imaging noted and Neurology following  - added vimpat for now  Another seizure overnight.  Will discuss plan of treatment with Neuro.      Debility  Very debilitated.  PT/OT consulted.  Out of bed bid.      Psychiatric symptoms  - likely post ictal state        VTE Risk Mitigation (From admission, onward)         Ordered     enoxaparin injection 40 mg  Daily         09/26/22 2333     Place sequential compression device  Until discontinued         09/26/22 2333     Place RAVI hose  Until discontinued         09/26/22 2333                Discharge Planning   VANESSA:      Code Status: Full Code   Is the patient medically ready for discharge?:     Reason for patient still in hospital (select all that apply): Patient trending condition  Discharge Plan A: Home with family                  Bakari Lopez MD  Department of Hospital Medicine   [facility] Surg

## 2022-10-01 NOTE — SIGNIFICANT EVENT
"I was notified by the nurse that Ms. Graves had another seizure.  This broke without event with Ativan 2mg.  On exam she is lethargic, but awake and able to answer simple questions.  It seems she may have bitten her tongue.  She is able to state her full name, then that she is thirsty.    Will need further recommendations from Neuro today.  ?Need for 24 hour EEG.        Current Facility-Administered Medications:     divalproex EC tablet 500 mg, 500 mg, Oral, Q12H     lacosamide (VIMPAT) 200 mg in sodium chloride 0.9% 100 mL IVPB, 200 mg, Intravenous, Q12H,     levetiracetam 500 mg/5 mL (5 mL) liquid Soln 1,250 mg, 1,250 mg, Oral, BID     LORazepam injection 2 mg, 2 mg, Intravenous, Q4H PRN, seizure    BP (!) 101/52 (BP Location: Left arm, Patient Position: Lying)   Pulse 64   Temp 98.1 °F (36.7 °C) (Oral)   Resp 18   Ht 4' 10" (1.473 m)   Wt 58.1 kg (128 lb 1.4 oz)   LMP 09/04/2022 (Exact Date)   SpO2 97%   Breastfeeding No   BMI 26.77 kg/m²         "

## 2022-10-01 NOTE — SUBJECTIVE & OBJECTIVE
Interval History: another seizure overnight.    Review of Systems   HENT:  Negative for ear discharge and ear pain.    Eyes:  Negative for discharge and itching.   Respiratory:  Negative for cough and shortness of breath.    Endocrine: Negative for cold intolerance and heat intolerance.   Objective:     Vital Signs (Most Recent):  Temp: 98.4 °F (36.9 °C) (10/01/22 1052)  Pulse: 68 (10/01/22 1052)  Resp: 18 (10/01/22 1052)  BP: 95/73 (10/01/22 1052)  SpO2: 99 % (10/01/22 1052)   Vital Signs (24h Range):  Temp:  [97.4 °F (36.3 °C)-98.6 °F (37 °C)] 98.4 °F (36.9 °C)  Pulse:  [60-79] 68  Resp:  [16-19] 18  SpO2:  [97 %-100 %] 99 %  BP: ()/(52-77) 95/73     Weight: 58.1 kg (128 lb 1.4 oz)  Body mass index is 26.77 kg/m².    Intake/Output Summary (Last 24 hours) at 10/1/2022 1305  Last data filed at 10/1/2022 1304  Gross per 24 hour   Intake 697.31 ml   Output 300 ml   Net 397.31 ml        Physical Exam  Vitals and nursing note reviewed.   Constitutional:       General: She is not in acute distress.     Appearance: Normal appearance.   HENT:      Head: Normocephalic and atraumatic.      Right Ear: External ear normal.      Left Ear: External ear normal.      Nose: Nose normal. No congestion or rhinorrhea.      Mouth/Throat:      Mouth: Mucous membranes are dry.      Pharynx: No oropharyngeal exudate.   Eyes:      General: No scleral icterus.     Conjunctiva/sclera: Conjunctivae normal.   Cardiovascular:      Rate and Rhythm: Normal rate and regular rhythm.      Pulses: Normal pulses.      Heart sounds: Normal heart sounds. No murmur heard.  Pulmonary:      Effort: Pulmonary effort is normal. No respiratory distress.      Breath sounds: Normal breath sounds. No wheezing, rhonchi or rales.   Abdominal:      General: Bowel sounds are normal. There is no distension.      Palpations: Abdomen is soft.      Tenderness: There is no abdominal tenderness. There is no guarding.   Musculoskeletal:         General: No swelling  or tenderness. Normal range of motion.      Cervical back: Neck supple.      Right lower leg: No edema.      Left lower leg: No edema.   Lymphadenopathy:      Cervical: No cervical adenopathy.   Skin:     General: Skin is warm and dry.      Capillary Refill: Capillary refill takes less than 2 seconds.      Coloration: Skin is not jaundiced or pale.   Neurological:      Mental Status: She is alert.      Sensory: No sensory deficit.      Motor: No weakness.       Significant Labs: All pertinent labs within the past 24 hours have been reviewed.  BMP: No results for input(s): GLU, NA, K, CL, CO2, BUN, CREATININE, CALCIUM, MG in the last 48 hours.  CBC: No results for input(s): WBC, HGB, HCT, PLT in the last 48 hours.    Significant Imaging: I have reviewed all pertinent imaging results/findings within the past 24 hours.

## 2022-10-01 NOTE — PROGRESS NOTES
"[facility] Surg  Neurology  Progress Note    Patient Name: Jen March  MRN: [id number]  Admission Date: 9/26/2022  Hospital Length of Stay: 5 days  Code Status: Full Code   Attending Provider: Bakari Lopez MD  Primary Care Physician: Primary Doctor No   Principal Problem:Seizures    Subjective:     Interval History: 36 y/o female with Hx of seizures was transferred to [facility] for evaluation due to multiple seizures. Pt has apparently has several episodes of "staring off". Hx of obtain from her nephew who lives with her. During interview pt says "my brain is bad", "I need help" but does not answer questions appropriately. Pt is on levetiracetam 1000 mg BID and valproic acid 250 mg four times daily.      -9/28/22: A few seizures reported.     -9/30/22: No seizures today. Pt requesting to be discharge home.    -10/1/22: Seizure reported overnight.    Current Neurological Medications:     Current Facility-Administered Medications   Medication Dose Route Frequency Provider Last Rate Last Admin    acetaminophen tablet 650 mg  650 mg Oral Q4H PRN SARAH Bautista MD   650 mg at 09/30/22 1430    aluminum-magnesium hydroxide-simethicone 200-200-20 mg/5 mL suspension 30 mL  30 mL Oral QID PRN SARAH Bautista MD        dextrose 50% injection 12.5 g  12.5 g Intravenous PRN SARAH Bautista MD        dextrose 50% injection 25 g  25 g Intravenous PRN SARAH Bautista MD        divalproex EC tablet 500 mg  500 mg Oral Q12H Luiz Weaver MD   500 mg at 10/01/22 0858    enoxaparin injection 40 mg  40 mg Subcutaneous Daily WCitlalli Bautista MD   40 mg at 09/30/22 1812    glucagon (human recombinant) injection 1 mg  1 mg Intramuscular PRN SARAH Bautista MD        glucose chewable tablet 16 g  16 g Oral PRN SARAH Bautista MD        glucose chewable tablet 24 g  24 g Oral PRN SARAH Bautista MD        influenza (QUADRIVALENT PF) vaccine 0.5 mL  0.5 mL Intramuscular Prior to discharge Jean Pierre Infante MD   "      levetiracetam 500 mg/5 mL (5 mL) liquid Soln 1,500 mg  1,500 mg Oral BID Luiz Weaver MD        LORazepam injection 2 mg  2 mg Intravenous Q4H PRN W. Devante Bautista MD   2 mg at 10/01/22 0342    melatonin tablet 6 mg  6 mg Oral Nightly PRN W. Devante Bautisat MD        naloxone 0.4 mg/mL injection 0.02 mg  0.02 mg Intravenous PRN KONRAD. Devante Bautista MD        ondansetron injection 4 mg  4 mg Intravenous Q8H PRN WCitlalli Bautista MD        polyethylene glycol packet 17 g  17 g Oral Daily W. Devante Bautista MD   17 g at 10/01/22 0904    prochlorperazine injection Soln 5 mg  5 mg Intravenous Q6H PRN W. Devante Bautista MD        senna-docusate 8.6-50 mg per tablet 1 tablet  1 tablet Oral BID PRN SARAH Bautista MD        simethicone chewable tablet 80 mg  1 tablet Oral QID PRN WCitlalli Bautista MD        sodium chloride 0.9% flush 10 mL  10 mL Intravenous Q12H PRN SARAH Bautista MD           Review of Systems  Constitutional:  Negative for fever.   HENT:  Negative for trouble swallowing.    Eyes:  Negative for photophobia.   Respiratory:  Negative for shortness of breath.    Cardiovascular:  Negative for chest pain.   Genitourinary:  Negative for flank pain.   Musculoskeletal:  Negative for back pain.   Neurological:  Negative for headaches.   Psychiatric: Negative for hallucinations       Objective:     Vital Signs (Most Recent):  Temp: 98.4 °F (36.9 °C) (10/01/22 1052)  Pulse: 68 (10/01/22 1052)  Resp: 18 (10/01/22 1052)  BP: 95/73 (10/01/22 1052)  SpO2: 99 % (10/01/22 1052) Vital Signs (24h Range):  Temp:  [97.4 °F (36.3 °C)-98.6 °F (37 °C)] 98.4 °F (36.9 °C)  Pulse:  [60-79] 68  Resp:  [16-19] 18  SpO2:  [97 %-100 %] 99 %  BP: ()/(52-77) 95/73     Weight: 58.1 kg (128 lb 1.4 oz)  Body mass index is 26.77 kg/m².    Physical Exam  Constitutional:       General: She is not in acute distress.  HENT:      Head: Normocephalic.      Right Ear: External ear normal.      Left Ear: External ear normal.   Eyes:       General:         Right eye: No discharge.         Left eye: No discharge.   Cardiovascular:      Rate and Rhythm: Normal rate.   Pulmonary:      Breath sounds: Normal breath sounds.   Abdominal:      Palpations: Abdomen is soft.   Musculoskeletal:         General: No swelling.      Cervical back: Neck supple.   Skin:     Findings: No rash.   Neurological:      Motor: Motor strength is normal.   Psychiatric:         Speech: Speech normal.         NEUROLOGICAL EXAMINATION:      MENTAL STATUS   Speech: speech is normal   Level of consciousness: alert       Oriented to self, knows she is in a hospital      CRANIAL NERVES      CN III, IV, VI   Right pupil: Size: 3 mm. Shape: regular.   Left pupil: Size: 3 mm. Shape: regular.   Conjugate gaze: present     CN VII   Right facial weakness: none  Left facial weakness: none     CN XII   Tongue deviation: none     MOTOR EXAM      Strength   Strength 5/5 throughout.        Significant Labs: CBC: No results for input(s): WBC, HGB, HCT, PLT in the last 48 hours.  CMP: No results for input(s): GLU, NA, K, CL, CO2, BUN, CREATININE, CALCIUM, MG, PROT, ALBUMIN, BILITOT, ALKPHOS, AST, ALT, ANIONGAP, EGFRNONAA in the last 48 hours.    Significant Imaging: I have reviewed all pertinent imaging results/findings within the past 24 hours.    Assessment and Plan:     36 y/o female consulted for seizures     Epilepsy: history of such since childhood. Uncertain if pt is adherent to meds. EEG shows what appears to be an epileptogenic focus on left temporal area. MRI of brain as above. Several episodes in hospital.  Levetiracetam 1500 mg BID  Valproic acid 500  mg BID.   Repeated level os 63. Will adjust accordingly.  Monitor for side effects such as somnolence as result of increase in her AED's.  Try to avoid benzodiazepines every time pt has a seizure unless event lasts longer then one minute or there are signs of respiratory distress, hypoxia, marked hypertension.  Seizure restrictions  are but not limited to: no driving for six months after last seizure; avoid swimming, high altitude activities, operating heavy machinery, bathing unattended, or engaging in activities in where a seizure will cause harm to self or others.          Active Diagnoses:    Diagnosis Date Noted POA    PRINCIPAL PROBLEM:  Seizures [R56.9] 09/27/2022 Yes    Debility [R53.81] 09/30/2022 No    Psychiatric symptoms [F99] 09/27/2022 Yes      Problems Resolved During this Admission:       VTE Risk Mitigation (From admission, onward)           Ordered     enoxaparin injection 40 mg  Daily         09/26/22 2333     Place sequential compression device  Until discontinued         09/26/22 2333     Place RAVI hose  Until discontinued         09/26/22 2333                    Luiz Weaver MD  Neurology  [facility] Surg

## 2022-10-02 VITALS
BODY MASS INDEX: 26.88 KG/M2 | OXYGEN SATURATION: 100 % | SYSTOLIC BLOOD PRESSURE: 93 MMHG | WEIGHT: 128.06 LBS | HEIGHT: 58 IN | TEMPERATURE: 98 F | DIASTOLIC BLOOD PRESSURE: 60 MMHG | HEART RATE: 63 BPM | RESPIRATION RATE: 18 BRPM

## 2022-10-02 PROCEDURE — 25000003 PHARM REV CODE 250: Performed by: PSYCHIATRY & NEUROLOGY

## 2022-10-02 PROCEDURE — 99232 PR SUBSEQUENT HOSPITAL CARE,LEVL II: ICD-10-PCS | Mod: ,,, | Performed by: PSYCHIATRY & NEUROLOGY

## 2022-10-02 PROCEDURE — 25000003 PHARM REV CODE 250: Performed by: INTERNAL MEDICINE

## 2022-10-02 PROCEDURE — 99232 SBSQ HOSP IP/OBS MODERATE 35: CPT | Mod: ,,, | Performed by: PSYCHIATRY & NEUROLOGY

## 2022-10-02 RX ORDER — DIVALPROEX SODIUM 500 MG/1
500 TABLET, DELAYED RELEASE ORAL EVERY 12 HOURS
Qty: 60 TABLET | Refills: 11 | Status: SHIPPED | OUTPATIENT
Start: 2022-10-02 | End: 2022-10-10 | Stop reason: SDUPTHER

## 2022-10-02 RX ORDER — LEVETIRACETAM 1000 MG/1
1500 TABLET ORAL 2 TIMES DAILY
Qty: 60 TABLET | Refills: 11 | Status: SHIPPED | OUTPATIENT
Start: 2022-10-02 | End: 2022-10-10 | Stop reason: SDUPTHER

## 2022-10-02 RX ADMIN — DIVALPROEX SODIUM 500 MG: 250 TABLET, DELAYED RELEASE ORAL at 08:10

## 2022-10-02 RX ADMIN — POLYETHYLENE GLYCOL 3350 17 G: 17 POWDER, FOR SOLUTION ORAL at 08:10

## 2022-10-02 RX ADMIN — LEVETIRACETAM 1500 MG: 100 SOLUTION ORAL at 08:10

## 2022-10-02 NOTE — NURSING
used, [facility] #[id number]. Pt given orange juice and jacklyn crackers. Pt ambulated to bath with standby assist; pt started her cycle. Remained free from fall/injury. Will cont to monitor

## 2022-10-02 NOTE — DISCHARGE SUMMARY
"[facility] Medicine  Discharge Summary      Patient Name: Jen March  MRN: [id number]  Patient Class: IP- Inpatient  Admission Date: 9/26/2022  Hospital Length of Stay: 6 days  Discharge Date and Time:  10/02/2022 10:49 AM  Attending Physician: Bakari Lopez MD   Discharging Provider: Bakari Lopez MD  Primary Care Provider: Primary Doctor No      HPI:   Ms. Graves is a 36yo lady with a past medical history of seizure disorder.  She is on Keppra and Depakene at baseline.  Her family told the ED provider that she was due to get her Keppra filled today after completely running out (raising concerns for compliance).    Per sending MD, she was found unresponsive in her bed by her nephew this morning.  Her nephew also relayed that she had had a breakthrough seizure yesterday as well.  Her family then drove her to the ED at [facility].  She was confused on arrival with noted tremors and unresponsiveness from possible seizure activity.  They gave Ativan and the symptoms resolved.  After loading with the Keppra and Fosphenytoin below, she was still having some decreased level of awareness and periods of "staring off."    She tells me she is visiting family here from [place] for the past 3 days (this is not true, as she has seen the ED here in July for seizures and refills on medications).  She thinks she came to the hospital due to left calf pain going up her leg.  She is very vague and evasive when asking about her history of seizures.  She originally smiled and looked away, stating, "since I was young."  As I pinned her down exactly she finally volunteered "4 years."  She gets her seizure medicine Rx's from a doctor in [place].  She has an odd demeanor and states, "I've cried a lot since I was young."     In the ED her VS's were /61   Pulse 82   Temp 97.7 °F (36.5 °C) (Oral)   Resp 18   Ht 4' 10" (1.473 m)   Wt 56.7 kg (125 lb)   LMP 09/04/2022 (Exact Date)   SpO2 " "100%   Breastfeeding No   BMI 26.13 kg/m².   Labs showed , Hg 12.9, Cr 0.8, otherwise normal CBC, CMP.  Valproate 69 ( normal), ETOH < 10, UPT neg, UDS negative.  COVID NEG.  UA negative for infection.     NC CT head showed no changes compared to CT head 09/08/2022 including the mild cerebellar atrophy and focal calcifications at the medial right frontal and lateral left posterior frontal parietal cortex.  EKG showed NSR rate 83, normal.      In the ED she was treated with Fosphenytoin 20mg/Kg (1135mg) IV 1251, Keppra 1g iv 0821, Ativan 4mg iv 0816, and NS 1LL bolus 0829.       * No surgery found *      Hospital Course:   Admitted with recurrent seizures. Continued on home keppra and depakote. Neurology consulted. MRI and EEG done and noted:   "Focal calcific densities medial right posterior frontal lobe cortex.  Larger more posterosuperior opacity show some suggestion of possible cavernoma deformity.  Additional calcifications left lateral parietal. Prominence of subarachnoid pathways about cerebellar hemisphere discussed above. MRI brain otherwise normal." And "The presence of left fronto-temporal epileptiform discharges places the patient at risk for seizures arising from this region.  No seizures are seen during this record."  Patient has had multiple seizures despite being on medications, Vimpat added per Neurology.  Continued on treatment.  No further seizures overnight.  Patient unable to afford Vimpat.  Will discharge home on Keppra 1500 mg bid and Depakote 500 mg bid.  Patient will be referred to [facility].       Goals of Care Treatment Preferences:  Code Status: Full Code      Consults:   Consults (From admission, onward)        Status Ordering Provider     Inpatient consult to Neurology  Once        Provider:  Luiz Weaver MD    Completed SARAH PEREZ          No new Assessment & Plan notes have been filed under this hospital service since the last note was " generated.  Service: Hospital Medicine    Final Active Diagnoses:    Diagnosis Date Noted POA    PRINCIPAL PROBLEM:  Seizures [R56.9] 09/27/2022 Yes    Debility [R53.81] 09/30/2022 No    Psychiatric symptoms [F99] 09/27/2022 Yes      Problems Resolved During this Admission:       Discharged Condition: stable    Disposition: Home or Self Care    Follow Up:   Follow-up Information     [facility] Follow up in 1 week(s).    Contact information:  [address]  [place] [address] [phone]                       Patient Instructions:      Diet Adult Regular     Notify your health care provider if you experience any of the following:  temperature >100.4     Notify your health care provider if you experience any of the following:  persistent nausea and vomiting or diarrhea     Notify your health care provider if you experience any of the following:  severe uncontrolled pain     Notify your health care provider if you experience any of the following:  difficulty breathing or increased cough     Notify your health care provider if you experience any of the following:  persistent dizziness, light-headedness, or visual disturbances     Notify your health care provider if you experience any of the following:  increased confusion or weakness     Activity as tolerated           Pending Diagnostic Studies:     Procedure Component Value Units Date/Time    Cysticercosis antibody, IgG [[id number]] Collected: 09/27/22 0349    Order Status: Sent Lab Status: In process Updated: 09/27/22 0432    Specimen: Blood          Medications:  Reconciled Home Medications:      Medication List      START taking these medications    divalproex 500 MG Tbec  Commonly known as: DEPAKOTE  Take 1 tablet (500 mg total) by mouth every 12 (twelve) hours.        CHANGE how you take these medications    levETIRAcetam 1000 MG tablet  Commonly known as: KEPPRA  Take 1.5 tablets (1,500 mg total) by mouth 2 (two) times daily.  What changed:    · medication strength  · how much to take        STOP taking these medications    valproic acid 250 mg capsule  Commonly known as: DEPAKENE            Indwelling Lines/Drains at time of discharge:   Lines/Drains/Airways     None                 Time spent on the discharge of patient: >30 minutes         Bakari Lopez MD  Department of Hospital Medicine  [facility]

## 2022-10-02 NOTE — PLAN OF CARE
Problem: Adult Inpatient Plan of Care  Goal: Plan of Care Review  Outcome: Ongoing, Progressing  Flowsheets (Taken 10/2/2022 0815)  Plan of Care Reviewed With: patient  Goal: Patient-Specific Goal (Individualized)  Outcome: Ongoing, Progressing     Problem: Adult Inpatient Plan of Care  Goal: Plan of Care Review  Outcome: Ongoing, Progressing  Flowsheets (Taken 10/2/2022 0815)  Plan of Care Reviewed With: patient     Problem: Adult Inpatient Plan of Care  Goal: Plan of Care Review  Outcome: Ongoing, Progressing  Flowsheets (Taken 10/2/2022 0815)  Plan of Care Reviewed With: patient     Problem: Adult Inpatient Plan of Care  Goal: Patient-Specific Goal (Individualized)  Outcome: Ongoing, Progressing     Problem: Adult Inpatient Plan of Care  Goal: Patient-Specific Goal (Individualized)  Outcome: Ongoing, Progressing

## 2022-10-02 NOTE — PROGRESS NOTES
"[facility] - Med Surg  Neurology  Progress Note    Patient Name: Jen March  MRN: [id number]  Admission Date: 9/26/2022  Hospital Length of Stay: 6 days  Code Status: Full Code   Attending Provider: Bakari Lopez MD  Primary Care Physician: Primary Doctor No   Principal Problem:Seizures    Subjective:     Interval History: 36 y/o female with Hx of seizures was transferred to [facility] for evaluation due to multiple seizures. Pt has apparently has several episodes of "staring off". Hx of obtain from her nephew who lives with her. During interview pt says "my brain is bad", "I need help" but does not answer questions appropriately. Pt is on levetiracetam 1000 mg BID and valproic acid 250 mg four times daily.      -9/28/22: A few seizures reported.     -9/30/22: No seizures today. Pt requesting to be discharge home.     -10/1/22: Seizure reported overnight.    -10/2/22: No seizures. Pt is alert and oriented.    Current Neurological Medications:     Current Facility-Administered Medications   Medication Dose Route Frequency Provider Last Rate Last Admin    acetaminophen tablet 650 mg  650 mg Oral Q4H PRN W. Devante Bautista MD   650 mg at 09/30/22 1430    aluminum-magnesium hydroxide-simethicone 200-200-20 mg/5 mL suspension 30 mL  30 mL Oral QID PRN SARAH Bautista MD        dextrose 50% injection 12.5 g  12.5 g Intravenous PRN SARAH Bautista MD        dextrose 50% injection 25 g  25 g Intravenous PRN SARAH Bautista MD        divalproex EC tablet 500 mg  500 mg Oral Q12H Luiz Weaver MD   500 mg at 10/02/22 0833    enoxaparin injection 40 mg  40 mg Subcutaneous Daily WCitlalli Bautista MD   40 mg at 10/01/22 1706    glucagon (human recombinant) injection 1 mg  1 mg Intramuscular PRN SARAH Bautista MD        glucose chewable tablet 16 g  16 g Oral PRN WCitlalli Bautista MD        glucose chewable tablet 24 g  24 g Oral PRN SARAH Bautista MD        influenza (QUADRIVALENT PF) vaccine 0.5 mL  0.5 mL " Intramuscular Prior to discharge Jean Pierre Infante MD        levetiracetam 500 mg/5 mL (5 mL) liquid Soln 1,500 mg  1,500 mg Oral BID Luiz Weaver MD   1,500 mg at 10/02/22 0835    LORazepam injection 2 mg  2 mg Intravenous Q4H PRN W. Devante Bautista MD   2 mg at 10/01/22 0342    melatonin tablet 6 mg  6 mg Oral Nightly PRN W. Devante Bautista MD        naloxone 0.4 mg/mL injection 0.02 mg  0.02 mg Intravenous PRN W. Devante Bautista MD        ondansetron injection 4 mg  4 mg Intravenous Q8H PRN W. Devante Bautista MD        polyethylene glycol packet 17 g  17 g Oral Daily W. Devante Bautista MD   17 g at 10/02/22 0840    prochlorperazine injection Soln 5 mg  5 mg Intravenous Q6H PRN W. Devante Bautista MD        senna-docusate 8.6-50 mg per tablet 1 tablet  1 tablet Oral BID PRN KONRAD. Devante Buatista MD        simethicone chewable tablet 80 mg  1 tablet Oral QID PRN W. Devante Bautista MD        sodium chloride 0.9% flush 10 mL  10 mL Intravenous Q12H PRN SARAH Bautista MD           Review of Systems  Constitutional:  Negative for fever.   HENT:  Negative for trouble swallowing.    Eyes:  Negative for photophobia.   Respiratory:  Negative for shortness of breath.    Cardiovascular:  Negative for chest pain.   Genitourinary:  Negative for flank pain.   Musculoskeletal:  Negative for back pain.   Neurological:  Negative for headaches.   Psychiatric: Negative for hallucinations    Objective:     Vital Signs (Most Recent):  Temp: 98.1 °F (36.7 °C) (10/02/22 1113)  Pulse: 63 (10/02/22 1113)  Resp: 18 (10/02/22 1113)  BP: 93/60 (10/02/22 0706)  SpO2: 100 % (10/02/22 1113) Vital Signs (24h Range):  Temp:  [98.1 °F (36.7 °C)-98.7 °F (37.1 °C)] 98.1 °F (36.7 °C)  Pulse:  [59-75] 63  Resp:  [15-18] 18  SpO2:  [98 %-100 %] 100 %  BP: ()/(53-64) 93/60     Weight: 58.1 kg (128 lb 1.4 oz)  Body mass index is 26.77 kg/m².    Physical Exam  Constitutional:       General: She is not in acute distress.  HENT:      Head: Normocephalic.      Right  Ear: External ear normal.      Left Ear: External ear normal.   Eyes:      General:         Right eye: No discharge.         Left eye: No discharge.   Cardiovascular:      Rate and Rhythm: Normal rate.   Pulmonary:      Breath sounds: Normal breath sounds.   Abdominal:      Palpations: Abdomen is soft.   Musculoskeletal:         General: No swelling.      Cervical back: Neck supple.   Skin:     Findings: No rash.   Neurological:      Motor: Motor strength is normal.   Psychiatric:         Speech: Speech normal.         NEUROLOGICAL EXAMINATION:      MENTAL STATUS   Speech: speech is normal   Level of consciousness: alert       Oriented to self, knows she is in a hospital      CRANIAL NERVES      CN III, IV, VI   Right pupil: Size: 3 mm. Shape: regular.   Left pupil: Size: 3 mm. Shape: regular.   Conjugate gaze: present     CN VII   Right facial weakness: none  Left facial weakness: none     CN XII   Tongue deviation: none     MOTOR EXAM      Strength   Strength 5/5 throughout.        Significant Labs: CBC: No results for input(s): WBC, HGB, HCT, PLT in the last 48 hours.  CMP: No results for input(s): GLU, NA, K, CL, CO2, BUN, CREATININE, CALCIUM, MG, PROT, ALBUMIN, BILITOT, ALKPHOS, AST, ALT, ANIONGAP, EGFRNONAA in the last 48 hours.    Significant Imaging: I have reviewed all pertinent imaging results/findings within the past 24 hours.    Assessment and Plan:     y/o female consulted for seizures     Epilepsy: history of such since childhood. Uncertain if pt is adherent to meds. EEG shows what appears to be an epileptogenic focus on left temporal area. MRI of brain as above. Several episodes in hospital.  Levetiracetam 1500 mg BID  Valproic acid 500  mg BID.   Level is therapeutic.  Try to avoid benzodiazepines every time pt has a seizure unless event lasts longer then one minute or there are signs of respiratory distress, hypoxia, marked hypertension.  Seizure restrictions are but not limited to: no driving  for six months after last seizure; avoid swimming, high altitude activities, operating heavy machinery, bathing unattended, or engaging in activities in where a seizure will cause harm to self or others.    OK to D/C home. Needs neurology follow up in clinic.       Active Diagnoses:    Diagnosis Date Noted POA    PRINCIPAL PROBLEM:  Seizures [R56.9] 09/27/2022 Yes    Debility [R53.81] 09/30/2022 No    Psychiatric symptoms [F99] 09/27/2022 Yes      Problems Resolved During this Admission:       VTE Risk Mitigation (From admission, onward)           Ordered     enoxaparin injection 40 mg  Daily         09/26/22 2333     Place sequential compression device  Until discontinued         09/26/22 2333     Place RAVI hose  Until discontinued         09/26/22 2333                    Luiz Weaver MD  Neurology  [facility]

## 2022-10-02 NOTE — NURSING
used, Mona #[id number]. Scheduled medications given. Pt ambulated to bathroom with standby assist; remained free from fall/injury. Tele #[phone]. Telesitter remains at bedside. 0/10; no complaints. IV saline lock. Accu check no insulin cover. Seizure precautions in placed. Safety measures maintained. Will cont to monitor

## 2022-10-02 NOTE — NURSING
[facility]  Nurses Note -- 4 Eyes      10/1/2022       Skin assessed on: Saturday      [x] No Pressure Injuries Present    []Prevention Measures Documented    [] Yes LDA  for Pressure Injury Previously documented     [] Yes New Pressure Injury Discovered   [] LDA for New Pressure Injury Added      Attending RN:  Ailyn Padilla LPN     Second RN: Scarlett PCT

## 2022-10-02 NOTE — PLAN OF CARE
[facility] - Med Surg  Discharge Final Note    Primary Care Provider: Srinath Correia II, NP    Expected Discharge Date: 10/2/2022     Garcia # [id number] translated. All CM needs met. CM spoke to pt's niece Mona and informed her to download the [facility] pharmacy card and also talk to pharmacist at [facility] due to pt being uninsured.Pt's niece verbally expressed understanding. CM notified nurseKing that pt is ready for discharge from CM standpoint.    Final Discharge Note (most recent)       Final Note - 10/02/22 1147          Final Note    Assessment Type Final Discharge Note (P)      Anticipated Discharge Disposition Home or Self Care (P)      What phone number can be called within the next 1-3 days to see how you are doing after discharge? [phone] (P)      Hospital Resources/Appts/Education Provided Appointments scheduled and added to AVS;Appointments scheduled by Navigator/Coordinator (P)         Post-Acute Status    Discharge Delays None known at this time (P)                      Important Message from Medicare

## 2022-10-05 LAB — CYSTICERCOSIS ANTIBODY: NORMAL

## 2022-12-08 ENCOUNTER — PATIENT OUTREACH (OUTPATIENT)
Dept: ADMINISTRATIVE | Facility: HOSPITAL | Age: 37
End: 2022-12-08
Payer: MEDICAID

## 2023-01-23 ENCOUNTER — OFFICE VISIT (OUTPATIENT)
Dept: NEUROLOGY | Facility: CLINIC | Age: 38
End: 2023-01-23
Payer: MEDICAID

## 2023-01-23 VITALS
HEART RATE: 57 BPM | OXYGEN SATURATION: 99 % | HEIGHT: 58 IN | RESPIRATION RATE: 16 BRPM | DIASTOLIC BLOOD PRESSURE: 66 MMHG | BODY MASS INDEX: 24.43 KG/M2 | SYSTOLIC BLOOD PRESSURE: 100 MMHG | WEIGHT: 116.38 LBS

## 2023-01-23 DIAGNOSIS — R90.89 ABNORMAL FINDING ON MRI OF BRAIN: ICD-10-CM

## 2023-01-23 DIAGNOSIS — R05.8 OTHER COUGH: ICD-10-CM

## 2023-01-23 DIAGNOSIS — M54.50 ACUTE MIDLINE LOW BACK PAIN WITHOUT SCIATICA: ICD-10-CM

## 2023-01-23 DIAGNOSIS — G40.419 OTHER GENERALIZED EPILEPSY, INTRACTABLE, WITHOUT STATUS EPILEPTICUS: Primary | ICD-10-CM

## 2023-01-23 DIAGNOSIS — H92.03 ACUTE EAR PAIN, BILATERAL: ICD-10-CM

## 2023-01-23 DIAGNOSIS — D18.00 CAVERNOMA: ICD-10-CM

## 2023-01-23 PROCEDURE — 96372 THER/PROPH/DIAG INJ SC/IM: CPT | Mod: PBBFAC

## 2023-01-23 PROCEDURE — 99215 PR OFFICE/OUTPT VISIT, EST, LEVL V, 40-54 MIN: ICD-10-PCS | Mod: S$PBB,,, | Performed by: PSYCHIATRY & NEUROLOGY

## 2023-01-23 PROCEDURE — 99999 PR PBB SHADOW E&M-EST. PATIENT-LVL IV: CPT | Mod: PBBFAC,,, | Performed by: PSYCHIATRY & NEUROLOGY

## 2023-01-23 PROCEDURE — 99999 PR PBB SHADOW E&M-EST. PATIENT-LVL IV: ICD-10-PCS | Mod: PBBFAC,,, | Performed by: PSYCHIATRY & NEUROLOGY

## 2023-01-23 PROCEDURE — 99215 OFFICE O/P EST HI 40 MIN: CPT | Mod: S$PBB,,, | Performed by: PSYCHIATRY & NEUROLOGY

## 2023-01-23 PROCEDURE — 99214 OFFICE O/P EST MOD 30 MIN: CPT | Mod: PBBFAC | Performed by: PSYCHIATRY & NEUROLOGY

## 2023-01-23 RX ORDER — LEVETIRACETAM 500 MG/1
TABLET ORAL
Qty: 180 TABLET | Refills: 11 | Status: SHIPPED | OUTPATIENT
Start: 2023-01-23 | End: 2023-10-23 | Stop reason: SDUPTHER

## 2023-01-23 RX ORDER — KETOROLAC TROMETHAMINE 30 MG/ML
30 INJECTION, SOLUTION INTRAMUSCULAR; INTRAVENOUS
Status: COMPLETED | OUTPATIENT
Start: 2023-01-23 | End: 2023-01-23

## 2023-01-23 RX ADMIN — KETOROLAC TROMETHAMINE 30 MG: 30 INJECTION, SOLUTION INTRAMUSCULAR; INTRAVENOUS at 12:01

## 2023-01-23 NOTE — PROGRESS NOTES
USED    HPI: Jen March is a 37 y.o. female with seizure.  Seizures first began in her early childhood.      She was hospitalized in 9/2023 with status epilepticus    Neurology was consulted after she ws transferred from [facility] to [facility]    Compliance was questioned as she was out of meds reportedly at ER visit    Neurology recommendations reviewed.       The dose of her keppra was raised to to 1500mg BID    She is only on 750mg BID per her current bottle from an ER visit    She is not taking as instructed on the bottle - taking 750mg BID when bottle says to take 1000mg    She is taking Depakote as ordered    She has been having headaches    She continues with GTC seizures, estimating about 3 per week    Seizure frequency at the last visit:  2 seizures per week.     She reports back pain, ear pain and a dry cough. She does not have a primary care provider but has not reviewed these complaints with any providers    She started with bilateral ear pain for 3 days. Cough is several days long     Back pain is noted for 2 weeks. No injury. No prior history of back pain and no numbness and tingling in the legs    She states it is difficult for her to pay for meds and appointments      She is unemployed. She has not worked due to seizures.       She does not drive    She does not drink alcohol     No plans for further children. Is not sexually active    Moved here from [place] in 2021    Review of Systems   Constitutional:  Negative for fever.   HENT:  Negative for nosebleeds.    Eyes:  Negative for double vision.   Respiratory:  Negative for hemoptysis.    Cardiovascular:  Negative for leg swelling.   Gastrointestinal:  Negative for blood in stool.   Genitourinary:  Negative for hematuria.   Skin:  Negative for rash.   Neurological:  Positive for seizures.   Endo/Heme/Allergies:  Does not bruise/bleed easily.   Psychiatric/Behavioral:  Positive for memory loss.        I have  reviewed all of this patient's past medical and surgical histories as well as family and social histories and active allergies and medications as documented in the electronic medical record.        Exam:  Gen Appearance, well developed/nourished in no apparent distress  CV: 2+ distal pulses with no edema or swelling  Neuro:  MS: Awake, alert, oriented to place, person, time, situation. Sustains attention. Recent/remote memory intact, Language is full to spontaneous speech/repetition/naming/comprehension. Fund of Knowledge is full  CN: Optic discs are flat with normal vasculature, PERRL, Extraoccular movements and visual fields are full. Normal facial sensation and strength, Hearing symmetric, Tongue and Palate are midline and strong. Shoulder Shrug symmetric and strong.  Motor: Normal bulk, tone, no abnormal movements. 5/5 strength bilateral upper/lower extremities with 2+ reflexes and  no clonus  Sensory: symmetric to emp, and vibration Romberg negative  Cerebellar: Finger-nose,Heal-shin, Rapid alternating movements intact  Gait: Normal stance, no ataxia  TM clear bilaterally    Imagin2022 CT head: No acute intracranial hemorrhage or acute calvarial fracture.     Mild cerebellar atrophy which has been described in relation to seizure medication, please correlate.     6 mm calcification at the anteromedial right frontal cortex possibly reflecting postinfection calcification.      2022 EEG: Abnormal 1 hour EEG indicative of left frontal-temporal epileptogenicity with rare epileptiform discharges occurring in this region.  There are no seizures recorded.    2022 MRI brain: Focal calcific densities medial right posterior frontal lobe cortex.  Larger more posterosuperior opacity show some suggestion of possible cavernoma deformity.  Additional calcifications left lateral parietal.     Prominence of subarachnoid pathways about cerebellar hemisphere discussed above.     MRI brain otherwise normal.    Labs: 2022  "CBC shows elevated MCV count  CMP unremarkable  TSH, HIV and Hep C negative    Assessment/Plan: Jen March is a 37 y.o. female with seizures since her infancy  I recommend:     CT head 2022: mild cerebellar atrophy consistent with AED use  -also noted 6 mm calcification at the anteromedial right frontal cortex possibly reflecting postinfection calcification.  -Infection history noted in patient at age 9 months of age prior to seizures starting. Her mother also reportedly had a  infection and this may explain those findings  - MRI brain done during hospitalization at [facility] for status epilepticus in 2022: showed, "focal calcific densities medial right posterior frontal lobe cortex.  Larger more posterosuperior opacity show some suggestion of possible cavernoma deformity.  Additional calcifications left lateral parietal.Prominence of subarachnoid pathways about cerebellar hemisphere discussed above."  -Will refer to [facility] system neurology as patient lacks insurance and will benefit from epileptology and perhaps NSY referral given ? Cavernoma and other findings.     2. Clinically,  she does have tongue biting with spells. Although some spells seem emotionally mediated. Consider pseudoseizures in addition to epilepsy but note:  2022 EEG showed left frontal-temporal epileptogenicity with rare epileptiform discharges     -Try to raise dose of keppra back to 1500mg BID over 3 weeks to help with seizures and headaches unless side effects. She is supposed to be on 1500mg per last hospitalization. Continue  Depakote 500mg BID  -Poor adherence is an ongoing factor. This is due to financial constraint   -will need routine labs over time : CMP, CBC, VPA and Keppra levels but cost is a factor, she is uninsured. Referring to [facility] neurology as above.       3. MCV elevation noted  -normal B12 and folate levels  -denies EtOH use  -Elevated MCV is not a listed side effect of either of her " AEDs  -She endorsed some memory loss which may be related to seizures as well    4. Seizure precautions to continue as outlined prior    The patient was counseled on the teratogenic effects of anti-epileptic mediations including likely doubled rate of birth defects in infants born to mothers taking AEDs.  -She reports no plan for pregnancy/ not sexually active.       5. Today: She reports back pain, ear pain and a dry cough. She does not have a primary care provider. [facility]- refer to primary care at [facility] for further evaluation as she can't afford a large work up or frequent treatment here. This is not a primary care clinic  -TM clear bilaterally today, neurological exam unremarkable  -Toradol IM 30mg for back and ear pain    RTC 4 months unless she is established at [facility] neurology    Total time spent on DOS: 40 minutes

## 2023-01-24 ENCOUNTER — TELEPHONE (OUTPATIENT)
Dept: NEUROLOGY | Facility: CLINIC | Age: 38
End: 2023-01-24
Payer: MEDICAID

## 2023-01-24 NOTE — TELEPHONE ENCOUNTER
[facility]ia [facility]  Augustin #[id number] notified patient [phone] appointment scheduled on 1/26/2023 at 7:40am with , PC at [facility] as per referral for acute bilateral ear pain and cough, voiced understanding.  [facility] neurolog[facility] referral faxed [phone], confirmation received.

## 2023-03-09 ENCOUNTER — HOSPITAL ENCOUNTER (EMERGENCY)
Facility: HOSPITAL | Age: 38
Discharge: HOME | End: 2023-03-09
Attending: EMERGENCY MEDICINE
Payer: MEDICAID

## 2023-03-09 VITALS
HEART RATE: 54 BPM | DIASTOLIC BLOOD PRESSURE: 69 MMHG | BODY MASS INDEX: 23.74 KG/M2 | OXYGEN SATURATION: 100 % | WEIGHT: 113.63 LBS | RESPIRATION RATE: 18 BRPM | SYSTOLIC BLOOD PRESSURE: 100 MMHG | TEMPERATURE: 98 F

## 2023-03-09 DIAGNOSIS — U07.1 COVID: Primary | ICD-10-CM

## 2023-03-09 LAB
CTP QC/QA: YES
SARS-COV-2 RDRP RESP QL NAA+PROBE: POSITIVE

## 2023-03-09 PROCEDURE — 99283 EMERGENCY DEPT VISIT LOW MDM: CPT

## 2023-03-09 PROCEDURE — 25000003 PHARM REV CODE 250

## 2023-03-09 RX ORDER — ACETAMINOPHEN 500 MG
1000 TABLET ORAL
Status: COMPLETED | OUTPATIENT
Start: 2023-03-09 | End: 2023-03-09

## 2023-03-09 RX ORDER — CETIRIZINE HYDROCHLORIDE 10 MG/1
10 TABLET ORAL DAILY
Qty: 30 TABLET | Refills: 0 | Status: SHIPPED | OUTPATIENT
Start: 2023-03-09 | End: 2023-10-23

## 2023-03-09 RX ORDER — FLUTICASONE PROPIONATE 50 MCG
1 SPRAY, SUSPENSION (ML) NASAL 2 TIMES DAILY PRN
Qty: 15 G | Refills: 0 | Status: SHIPPED | OUTPATIENT
Start: 2023-03-09 | End: 2023-10-23

## 2023-03-09 RX ADMIN — ACETAMINOPHEN 1000 MG: 500 TABLET ORAL at 01:03

## 2023-03-09 NOTE — DISCHARGE INSTRUCTIONS
You tested positive for COVID.  You take the Flonase and Zyrtec as needed for congestion.  Main treatment for COVID is to control the body aches and fever.  Please take Tylenol and ibuprofen as needed and directed.  Drink plenty of fluids.  Follow-up with your doctor if her symptoms do not improve.  Please quarantine for 5 days from onset of symptoms.  You must also be 24 hours fever free without the use of medications before leaving quarantine.      Has dado positivo por COVID. Dionne el Flonase y Zyrtec según sea necesario para la congestión. El tratamiento principal para COVID es controlar los kennedy corporales y la fiebre. Brush Tylenol e ibuprofeno según sea necesario y según las indicaciones. Beber mucho líquido. Parminder un seguimiento con ramsey médico si isiah síntomas no mejoran. Por favor, ponga en cuarentena sae 5 días desde el inicio de los síntomas. También debe estar 24 horas sin fiebre sin el uso de medicamentos antes de salir de la cuarentena.

## 2023-03-09 NOTE — ED PROVIDER NOTES
Encounter Date: 3/9/2023       History     Chief Complaint   Patient presents with    Generalized Body Aches     Patient c/o of body aches, headache,cough, and fever. Patients father is dx with covid and patient is requesting to be tested for covid.      29-year-old female with history of epilepsy presents to ED with complaints of body aches, headache, cough, fever.  She reports that her father tested positive for COVID and has been in the hospital.  She has been in to visit him.  She is not taking any medication for this.  Symptoms started today.    The history is provided by the patient. The history is limited by a language barrier. A  was used.   Review of patient's allergies indicates:  No Known Allergies  Past Medical History:   Diagnosis Date    Seizures      No past surgical history on file.  Family History   Problem Relation Age of Onset    Diabetes Mother     Hypertension Mother     Asthma Father      Social History     Tobacco Use    Smoking status: Never     Passive exposure: Never    Smokeless tobacco: Never   Substance Use Topics    Alcohol use: Never    Drug use: Never     Review of Systems   Constitutional:  Positive for fever.   HENT:  Negative for sore throat.    Eyes: Negative.    Respiratory:  Positive for cough. Negative for shortness of breath.    Cardiovascular:  Negative for chest pain.   Gastrointestinal:  Negative for nausea.   Endocrine: Negative.    Genitourinary:  Negative for dysuria.   Musculoskeletal:  Positive for myalgias. Negative for back pain.   Skin:  Negative for rash.   Allergic/Immunologic: Negative.    Neurological:  Positive for headaches. Negative for weakness.   Hematological:  Does not bruise/bleed easily.   Psychiatric/Behavioral: Negative.       Physical Exam     Initial Vitals [03/09/23 1242]   BP Pulse Resp Temp SpO2   100/69 (!) 54 18 98.1 °F (36.7 °C) 100 %      MAP       --         Physical Exam    Nursing note and vitals  reviewed.  Constitutional: She appears well-developed and well-nourished.   HENT:   Head: Normocephalic and atraumatic.   Nose: Mucosal edema and rhinorrhea present.   Mouth/Throat: Uvula is midline and mucous membranes are normal. Posterior oropharyngeal erythema present. No oropharyngeal exudate, posterior oropharyngeal edema or tonsillar abscesses.   Eyes: EOM are normal.   Neck: Neck supple.   Normal range of motion.  Cardiovascular:  Normal rate and regular rhythm.           Pulmonary/Chest: Breath sounds normal. No respiratory distress. She has no wheezes.   Abdominal: She exhibits no distension.   Musculoskeletal:         General: Normal range of motion.      Cervical back: Normal range of motion and neck supple.     Neurological: She is alert and oriented to person, place, and time.   Skin: Skin is warm and dry.   Psychiatric: Thought content normal.       ED Course   Procedures  Labs Reviewed   SARS-COV-2 RDRP GENE - Abnormal; Notable for the following components:       Result Value    POC Rapid COVID Positive (*)     All other components within normal limits    Narrative:     ..This test utilizes isothermal nucleic acid amplification technology to detect the SARS-CoV-2 RdRp nucleic acid segment. The analytical sensitivity (limit of detection) is 500 copies/swab.     A POSITIVE result is indicative of the presence of SARS-CoV-2 RNA; clinical correlation with patient history and other diagnostic information is necessary to determine patient infection status.    A NEGATIVE result means that SARS-CoV-2 nucleic acids are not present above the limit of detection. A NEGATIVE result should be treated as presumptive. It does not rule out the possibility of COVID-19 and should not be the sole basis for treatment decisions. If COVID-19 is strongly suspected based on clinical and exposure history, re-testing using an alternate molecular assay should be considered.     This test is only for use under the Food and Drug  Administration s Emergency Use Authorization (EUA).     Commercial kits are provided by [employer]. Performance characteristics of the EUA have been independently verified by [facility] Department of Pathology and Laboratory Medicine.   _________________________________________________________________   The authorized Fact Sheet for Healthcare Providers and the authorized Fact Sheet for Patients of the ID NOW COVID-19 are available on the FDA website:    https://www.fda.gov/media/142817/download      https://www.fda.gov/media/294158/download              Imaging Results    None          Medications   acetaminophen tablet 1,000 mg (1,000 mg Oral Given 3/9/23 1303)     Medical Decision Making:   Clinical Tests:   Lab Tests: Ordered and Reviewed  ED Management:  37-year-old female with history of seizures presents to ED for above complaints.  She test positive for COVID today.  She does not appear toxic.  She does not appear hypoxic.  O2 saturation was 100%.  Her no signs respiratory distress noted.  Her lungs were clear in all fields.  She did endorse fatigue and body aches, however she did not take any medication.  She was given Tylenol in ED.  She was instructed to take Tylenol and ibuprofen as needed for fever and body aches.  She was given a prescription for Flonase and Zyrtec instructed to follow-up with primary care.  She is also instructed to quarantine for 5 days from onset of symptoms.  She was stable for discharge.                        Clinical Impression:   Final diagnoses:  [U07.1] COVID (Primary)        ED Disposition Condition    Discharge Stable          ED Prescriptions       Medication Sig Dispense Start Date End Date Auth. Provider    fluticasone propionate (FLONASE) 50 mcg/actuation nasal spray 1 spray (50 mcg total) by Each Nostril route 2 (two) times daily as needed. 15 g 3/9/2023 -- Eloy Loredo NP    cetirizine (ZYRTEC) 10 MG tablet Take 1 tablet (10 mg total) by mouth  once daily. 30 tablet 3/9/2023 3/8/2024 Eloy Loredo NP          Follow-up Information       Follow up With Specialties Details Why Contact Info    Srinath Correia II, NP Emergency Medicine In 2 days  [address] [facility] [address]  [phone]               Eloy Loredo NP  03/09/23 2021

## 2023-09-17 ENCOUNTER — HOSPITAL ENCOUNTER (EMERGENCY)
Facility: HOSPITAL | Age: 38
Discharge: HOME | End: 2023-09-17
Attending: EMERGENCY MEDICINE
Payer: MEDICAID

## 2023-09-17 VITALS
BODY MASS INDEX: 26.45 KG/M2 | RESPIRATION RATE: 20 BRPM | DIASTOLIC BLOOD PRESSURE: 79 MMHG | HEART RATE: 75 BPM | HEIGHT: 58 IN | WEIGHT: 126 LBS | TEMPERATURE: 99 F | SYSTOLIC BLOOD PRESSURE: 115 MMHG | OXYGEN SATURATION: 100 %

## 2023-09-17 DIAGNOSIS — Z13.9 ENCOUNTER FOR MEDICAL SCREENING EXAMINATION: Primary | ICD-10-CM

## 2023-09-17 LAB
ALBUMIN SERPL BCP-MCNC: 4 G/DL (ref 3.5–5.2)
ALP SERPL-CCNC: 68 U/L (ref 55–135)
ALT SERPL W/O P-5'-P-CCNC: 19 U/L (ref 10–44)
AMPHET+METHAMPHET UR QL: NEGATIVE
ANION GAP SERPL CALC-SCNC: 2 MMOL/L (ref 3–11)
AST SERPL-CCNC: 10 U/L (ref 10–40)
B-HCG UR QL: NEGATIVE
BACTERIA #/AREA URNS HPF: NEGATIVE /HPF
BARBITURATES UR QL SCN>200 NG/ML: NEGATIVE
BASOPHILS # BLD AUTO: 0.04 K/UL (ref 0–0.2)
BASOPHILS NFR BLD: 0.4 % (ref 0–1.9)
BENZODIAZ UR QL SCN>200 NG/ML: NEGATIVE
BILIRUB SERPL-MCNC: 0.4 MG/DL (ref 0.1–1)
BILIRUB UR QL STRIP: NEGATIVE
BUN SERPL-MCNC: 12 MG/DL (ref 6–20)
BZE UR QL SCN: NEGATIVE
CALCIUM SERPL-MCNC: 9.7 MG/DL (ref 8.7–10.5)
CANNABINOIDS UR QL SCN: NEGATIVE
CHLORIDE SERPL-SCNC: 109 MMOL/L (ref 95–110)
CLARITY UR: CLEAR
CO2 SERPL-SCNC: 30 MMOL/L (ref 23–29)
COLOR UR: YELLOW
CREAT SERPL-MCNC: 0.7 MG/DL (ref 0.5–1.4)
CREAT UR-MCNC: 17.6 MG/DL (ref 15–325)
DIFFERENTIAL METHOD: ABNORMAL
EOSINOPHIL # BLD AUTO: 0.1 K/UL (ref 0–0.5)
EOSINOPHIL NFR BLD: 1.2 % (ref 0–8)
ERYTHROCYTE [DISTWIDTH] IN BLOOD BY AUTOMATED COUNT: 11.4 % (ref 11.5–14.5)
EST. GFR  (NO RACE VARIABLE): >60 ML/MIN/1.73 M^2
ETHANOL SERPL-MCNC: <3 MG/DL
GLUCOSE SERPL-MCNC: 86 MG/DL (ref 70–110)
GLUCOSE UR QL STRIP: NEGATIVE
HCT VFR BLD AUTO: 37.6 % (ref 37–48.5)
HGB BLD-MCNC: 12.8 G/DL (ref 12–16)
HGB UR QL STRIP: ABNORMAL
HYALINE CASTS #/AREA URNS LPF: 1.1 /LPF
IMM GRANULOCYTES # BLD AUTO: 0.02 K/UL (ref 0–0.04)
IMM GRANULOCYTES NFR BLD AUTO: 0.2 % (ref 0–0.5)
KETONES UR QL STRIP: NEGATIVE
LEUKOCYTE ESTERASE UR QL STRIP: ABNORMAL
LYMPHOCYTES # BLD AUTO: 2.9 K/UL (ref 1–4.8)
LYMPHOCYTES NFR BLD: 31.9 % (ref 18–48)
MCH RBC QN AUTO: 35.6 PG (ref 27–31)
MCHC RBC AUTO-ENTMCNC: 34 G/DL (ref 32–36)
MCV RBC AUTO: 104 FL (ref 82–98)
METHADONE UR QL SCN>300 NG/ML: NEGATIVE
MICROSCOPIC COMMENT: ABNORMAL
MONOCYTES # BLD AUTO: 0.7 K/UL (ref 0.3–1)
MONOCYTES NFR BLD: 7.2 % (ref 4–15)
NEUTROPHILS # BLD AUTO: 5.3 K/UL (ref 1.8–7.7)
NEUTROPHILS NFR BLD: 59.1 % (ref 38–73)
NITRITE UR QL STRIP: NEGATIVE
NRBC BLD-RTO: 0 /100 WBC
OPIATES UR QL SCN: NEGATIVE
PCP UR QL SCN>25 NG/ML: NEGATIVE
PH UR STRIP: 6 [PH] (ref 5–8)
PLATELET # BLD AUTO: 239 K/UL (ref 150–450)
PMV BLD AUTO: 10.3 FL (ref 9.2–12.9)
POTASSIUM SERPL-SCNC: 3.3 MMOL/L (ref 3.5–5.1)
PROT SERPL-MCNC: 7.6 G/DL (ref 6–8.4)
PROT UR QL STRIP: NEGATIVE
RBC # BLD AUTO: 3.6 M/UL (ref 4–5.4)
RBC #/AREA URNS HPF: 2 /HPF (ref 0–4)
SODIUM SERPL-SCNC: 141 MMOL/L (ref 136–145)
SP GR UR STRIP: <=1.005 (ref 1–1.03)
SQUAMOUS #/AREA URNS HPF: 4 /HPF
TOXICOLOGY INFORMATION: NORMAL
URN SPEC COLLECT METH UR: ABNORMAL
UROBILINOGEN UR STRIP-ACNC: NEGATIVE EU/DL
WBC # BLD AUTO: 9.04 K/UL (ref 3.9–12.7)
WBC #/AREA URNS HPF: 29 /HPF (ref 0–5)

## 2023-09-17 PROCEDURE — 81025 URINE PREGNANCY TEST: CPT | Performed by: EMERGENCY MEDICINE

## 2023-09-17 PROCEDURE — 80053 COMPREHEN METABOLIC PANEL: CPT | Performed by: EMERGENCY MEDICINE

## 2023-09-17 PROCEDURE — 80307 DRUG TEST PRSMV CHEM ANLYZR: CPT | Performed by: EMERGENCY MEDICINE

## 2023-09-17 PROCEDURE — 36415 COLL VENOUS BLD VENIPUNCTURE: CPT | Performed by: EMERGENCY MEDICINE

## 2023-09-17 PROCEDURE — 85025 COMPLETE CBC W/AUTO DIFF WBC: CPT | Performed by: EMERGENCY MEDICINE

## 2023-09-17 PROCEDURE — 82077 ASSAY SPEC XCP UR&BREATH IA: CPT | Performed by: EMERGENCY MEDICINE

## 2023-09-17 PROCEDURE — 87086 URINE CULTURE/COLONY COUNT: CPT | Performed by: EMERGENCY MEDICINE

## 2023-09-17 PROCEDURE — 25000003 PHARM REV CODE 250: Performed by: EMERGENCY MEDICINE

## 2023-09-17 PROCEDURE — 99283 EMERGENCY DEPT VISIT LOW MDM: CPT

## 2023-09-17 PROCEDURE — 81000 URINALYSIS NONAUTO W/SCOPE: CPT | Mod: 59 | Performed by: EMERGENCY MEDICINE

## 2023-09-17 PROCEDURE — 87088 URINE BACTERIA CULTURE: CPT | Performed by: EMERGENCY MEDICINE

## 2023-09-17 RX ORDER — DIAZEPAM 5 MG/1
5 TABLET ORAL
Status: COMPLETED | OUTPATIENT
Start: 2023-09-17 | End: 2023-09-17

## 2023-09-17 RX ORDER — LEVETIRACETAM 500 MG/1
1500 TABLET ORAL ONCE
Status: COMPLETED | OUTPATIENT
Start: 2023-09-17 | End: 2023-09-17

## 2023-09-17 RX ORDER — LEVETIRACETAM 500 MG/1
1500 TABLET ORAL 2 TIMES DAILY
Status: DISCONTINUED | OUTPATIENT
Start: 2023-09-17 | End: 2023-09-17

## 2023-09-17 RX ADMIN — LEVETIRACETAM 1500 MG: 500 TABLET, FILM COATED ORAL at 05:09

## 2023-09-17 RX ADMIN — DIAZEPAM 5 MG: 5 TABLET ORAL at 04:09
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
## 2023-09-19 LAB — BACTERIA UR CULT: ABNORMAL

## 2023-10-23 ENCOUNTER — OFFICE VISIT (OUTPATIENT)
Dept: NEUROLOGY | Facility: CLINIC | Age: 38
End: 2023-10-23
Payer: MEDICAID

## 2023-10-23 ENCOUNTER — HOSPITAL ENCOUNTER (OUTPATIENT)
Dept: RADIOLOGY | Facility: HOSPITAL | Age: 38
Discharge: HOME | End: 2023-10-23
Attending: PSYCHIATRY & NEUROLOGY
Payer: MEDICAID

## 2023-10-23 VITALS
WEIGHT: 132.25 LBS | BODY MASS INDEX: 26.66 KG/M2 | DIASTOLIC BLOOD PRESSURE: 78 MMHG | HEART RATE: 78 BPM | HEIGHT: 59 IN | SYSTOLIC BLOOD PRESSURE: 100 MMHG | RESPIRATION RATE: 16 BRPM

## 2023-10-23 DIAGNOSIS — D18.00 CAVERNOMA: ICD-10-CM

## 2023-10-23 DIAGNOSIS — R10.9 STOMACH PAIN: ICD-10-CM

## 2023-10-23 DIAGNOSIS — M54.16 LUMBAR RADICULAR PAIN: ICD-10-CM

## 2023-10-23 DIAGNOSIS — R56.9 SEIZURE: Primary | ICD-10-CM

## 2023-10-23 DIAGNOSIS — R90.89 ABNORMAL FINDING ON MRI OF BRAIN: ICD-10-CM

## 2023-10-23 PROCEDURE — 1160F PR REVIEW ALL MEDS BY PRESCRIBER/CLIN PHARMACIST DOCUMENTED: ICD-10-PCS | Mod: CPTII,,, | Performed by: PSYCHIATRY & NEUROLOGY

## 2023-10-23 PROCEDURE — 3078F DIAST BP <80 MM HG: CPT | Mod: CPTII,,, | Performed by: PSYCHIATRY & NEUROLOGY

## 2023-10-23 PROCEDURE — 99999 PR PBB SHADOW E&M-EST. PATIENT-LVL IV: CPT | Mod: PBBFAC,,, | Performed by: PSYCHIATRY & NEUROLOGY

## 2023-10-23 PROCEDURE — 99215 OFFICE O/P EST HI 40 MIN: CPT | Mod: S$PBB,,, | Performed by: PSYCHIATRY & NEUROLOGY

## 2023-10-23 PROCEDURE — 1160F RVW MEDS BY RX/DR IN RCRD: CPT | Mod: CPTII,,, | Performed by: PSYCHIATRY & NEUROLOGY

## 2023-10-23 PROCEDURE — 3008F PR BODY MASS INDEX (BMI) DOCUMENTED: ICD-10-PCS | Mod: CPTII,,, | Performed by: PSYCHIATRY & NEUROLOGY

## 2023-10-23 PROCEDURE — 3074F PR MOST RECENT SYSTOLIC BLOOD PRESSURE < 130 MM HG: ICD-10-PCS | Mod: CPTII,,, | Performed by: PSYCHIATRY & NEUROLOGY

## 2023-10-23 PROCEDURE — 3008F BODY MASS INDEX DOCD: CPT | Mod: CPTII,,, | Performed by: PSYCHIATRY & NEUROLOGY

## 2023-10-23 PROCEDURE — 3074F SYST BP LT 130 MM HG: CPT | Mod: CPTII,,, | Performed by: PSYCHIATRY & NEUROLOGY

## 2023-10-23 PROCEDURE — 1159F PR MEDICATION LIST DOCUMENTED IN MEDICAL RECORD: ICD-10-PCS | Mod: CPTII,,, | Performed by: PSYCHIATRY & NEUROLOGY

## 2023-10-23 PROCEDURE — 99999 PR PBB SHADOW E&M-EST. PATIENT-LVL IV: ICD-10-PCS | Mod: PBBFAC,,, | Performed by: PSYCHIATRY & NEUROLOGY

## 2023-10-23 PROCEDURE — 1159F MED LIST DOCD IN RCRD: CPT | Mod: CPTII,,, | Performed by: PSYCHIATRY & NEUROLOGY

## 2023-10-23 PROCEDURE — 99215 PR OFFICE/OUTPT VISIT, EST, LEVL V, 40-54 MIN: ICD-10-PCS | Mod: S$PBB,,, | Performed by: PSYCHIATRY & NEUROLOGY

## 2023-10-23 PROCEDURE — 99214 OFFICE O/P EST MOD 30 MIN: CPT | Mod: PBBFAC | Performed by: PSYCHIATRY & NEUROLOGY

## 2023-10-23 PROCEDURE — 3078F PR MOST RECENT DIASTOLIC BLOOD PRESSURE < 80 MM HG: ICD-10-PCS | Mod: CPTII,,, | Performed by: PSYCHIATRY & NEUROLOGY

## 2023-10-23 RX ORDER — DIVALPROEX SODIUM 500 MG/1
500 TABLET, DELAYED RELEASE ORAL EVERY 12 HOURS
Qty: 60 TABLET | Refills: 11 | Status: SHIPPED | OUTPATIENT
Start: 2023-10-23 | End: 2024-10-22

## 2023-10-23 RX ORDER — LEVETIRACETAM 500 MG/1
TABLET ORAL
Qty: 180 TABLET | Refills: 11 | Status: SHIPPED | OUTPATIENT
Start: 2023-10-23

## 2023-10-23 NOTE — PROGRESS NOTES
" USED    HPI: Jen March is a 38 y.o. female with seizure.  Seizures first began in her early childhood.      She has not seen me since 1/2023    She has not seen [facility] neurology since      Seizures continue. She states she had seizure last week described as mild blurred vision and headaches but not GTC and no loss of consciousness/ she remembers these symptoms. This was associated with "body aches" and was not witnessed by family here today.    These spells have been 2-3 times per week and can be stronger with LOC and GTC activity.     She has fallen and bruised herself with some spells and sometimes bites her tongue    Larger spells are 2-3 days per months    She does not admit to stress as a trigger      Compliance with meds was good until she ran out a few days ago        Keppra dose is 1500mg BID and this seemed help her seizures reduce      Depakote use continues at 500mg BID    Headaches occur only with spells    Family states she sometimes get upset with her mood at times but not severely and this has been the case for years      Back pain noted at the last visit still noted. This is still in her middle of her back and does radiate down the right leg.     Ear pain and Dry Cough noted at the last visit resolved    She does not think she has insurance but system shows medicaid.     Memory Is about the same    She notes stomach pain for a month not related to eating and is  on the entire abdomen     She does not drive and does not work due to seizures    She does not drink alcohol     Per last visit: No plans for further children. Is not sexually active    Moved here from [place] in 2021    Review of Systems   Constitutional:  Negative for fever.   HENT:  Negative for nosebleeds.    Eyes:  Negative for double vision.   Respiratory:  Negative for hemoptysis.    Cardiovascular:  Negative for leg swelling.   Gastrointestinal:  Negative for blood in stool.   Genitourinary:  Negative for " hematuria.   Musculoskeletal:  Negative for falls.   Skin:  Negative for rash.   Neurological:  Positive for seizures.   Endo/Heme/Allergies:  Does not bruise/bleed easily.   Psychiatric/Behavioral:  Negative for memory loss.          I have reviewed all of this patient's past medical and surgical histories as well as family and social histories and active allergies and medications as documented in the electronic medical record.        Exam:  Gen Appearance, well developed/nourished in no apparent distress  CV: 2+ distal pulses with no edema or swelling  Neuro:  MS: Awake, alert, oriented to place, person, time, situation. Sustains attention. Recent/remote memory intact, Language is full to spontaneous speech/repetition/naming/comprehension. Fund of Knowledge is full  CN: Optic discs are flat with normal vasculature, PERRL, Extraoccular movements and visual fields are full. Normal facial sensation and strength, Hearing symmetric, Tongue and Palate are midline and strong. Shoulder Shrug symmetric and strong.  Motor: Normal bulk, tone, no abnormal movements. 5/5 strength bilateral upper/lower extremities with 2+ reflexes and  no clonus  Sensory: symmetric to temp, and vibration Romberg negative  Cerebellar: Finger-nose,Heal-shin, Rapid alternating movements intact  Gait: Normal stance, no ataxia  TM clear bilaterally    Imagin2022 CT head: No acute intracranial hemorrhage or acute calvarial fracture.     Mild cerebellar atrophy which has been described in relation to seizure medication, please correlate.     6 mm calcification at the anteromedial right frontal cortex possibly reflecting postinfection calcification.      2022 EEG: Abnormal 1 hour EEG indicative of left frontal-temporal epileptogenicity with rare epileptiform discharges occurring in this region.  There are no seizures recorded.    2022 MRI brain: Focal calcific densities medial right posterior frontal lobe cortex.  Larger more posterosuperior  "opacity show some suggestion of possible cavernoma deformity.  Additional calcifications left lateral parietal.     Prominence of subarachnoid pathways about cerebellar hemisphere discussed above.     MRI brain otherwise normal.    Labs: 2022 CBC shows elevated MCV count  CMP unremarkable  TSH, HIV and Hep C negative    Assessment/Plan: Jen March is a 38 y.o. female with seizures since her infancy  I recommend:     CT head 2022: mild cerebellar atrophy consistent with AED use  -also noted 6 mm calcification at the anteromedial right frontal cortex possibly reflecting postinfection calcification.  -Infection history noted in patient at age 9 months of age prior to seizures starting. Her mother also reportedly had a  infection and this may explain those findings  -  MRI brain done during hospitalization at [facility] for status epilepticus in 2022: showed, "focal calcific densities medial right posterior frontal lobe cortex.  Larger more posterosuperior opacity show some suggestion of possible cavernoma deformity.  Additional calcifications left lateral parietal.Prominence of subarachnoid pathways about cerebellar hemisphere discussed above."  -She lacked insurance at that time/ did not establish with [facility] since  -Showing Medicaid in our system. I suggested she clarify with the Medicaid office as she is not aware of this  -Repeat MRI brain now and consider NSY referral pending results.   -Will also order ambulatory EEG monitoring    2. Clinically,  she does have tongue biting with spells. Although some spells seem emotionally mediated. Consider pseudoseizures in addition to epilepsy but note:  2022 EEG showed left frontal-temporal epileptogenicity with rare epileptiform discharges   -Some reduction in GTC spells with increase in Keppra to 1500mg BID this yaer. Continue this dose. Also continue  Depakote 500mg BID  -Poor adherence is an ongoing factor. This is due " to financial constraint  and can't rule out pseudoseizures as well   -Labs: CMP, CBC, VPA and Keppra levels  today      3. MCV elevation noted  -normal B12 and folate levels  -denies EtOH use  -Elevated MCV is not a listed side effect of either of her AEDs  -She endorses some memory loss which may be related to seizures as well    4. Seizure precautions to continue as outlined prior  The patient was counseled on the teratogenic effects of anti-epileptic mediations including likely doubled rate of birth defects in infants born to mothers taking AEDs.  -She reports no plan for pregnancy/ not sexually active.       5. She has had chronic back pain now radicular to the legs  -Xray Lumbar spine  -consider PT pending results    6. Stomach pain noted, nonspecific   -Labs as above  -Refer to primary care as prior per orders    RTC 3 months    Total time spent on DOS including chart review and visit with translation: 40 minutes

## 2023-10-24 ENCOUNTER — TELEPHONE (OUTPATIENT)
Dept: NEUROLOGY | Facility: CLINIC | Age: 38
End: 2023-10-24
Payer: MEDICAID

## 2023-10-26 DIAGNOSIS — R71.8 ELEVATED MCV: Primary | ICD-10-CM

## 2023-10-30 ENCOUNTER — TELEPHONE (OUTPATIENT)
Dept: NEUROLOGY | Facility: CLINIC | Age: 38
End: 2023-10-30
Payer: MEDICAID

## 2023-11-02 ENCOUNTER — PATIENT MESSAGE (OUTPATIENT)
Dept: NEUROLOGY | Facility: CLINIC | Age: 38
End: 2023-11-02
Payer: MEDICAID

## 2023-11-03 ENCOUNTER — HOSPITAL ENCOUNTER (OUTPATIENT)
Dept: RADIOLOGY | Facility: HOSPITAL | Age: 38
Discharge: HOME | End: 2023-11-03
Attending: PSYCHIATRY & NEUROLOGY
Payer: MEDICAID

## 2023-11-03 DIAGNOSIS — R56.9 SEIZURE: ICD-10-CM

## 2023-11-03 DIAGNOSIS — R90.89 ABNORMAL FINDING ON MRI OF BRAIN: ICD-10-CM

## 2023-11-03 DIAGNOSIS — D18.00 CAVERNOMA: ICD-10-CM

## 2023-11-03 PROCEDURE — 25500020 PHARM REV CODE 255: Performed by: PSYCHIATRY & NEUROLOGY

## 2023-11-03 PROCEDURE — 70553 MRI BRAIN W WO CONTRAST: ICD-10-PCS | Mod: 26,,, | Performed by: RADIOLOGY

## 2023-11-03 PROCEDURE — A9585 GADOBUTROL INJECTION: HCPCS | Performed by: PSYCHIATRY & NEUROLOGY

## 2023-11-03 PROCEDURE — 70553 MRI BRAIN STEM W/O & W/DYE: CPT | Mod: 26,,, | Performed by: RADIOLOGY

## 2023-11-03 PROCEDURE — 70553 MRI BRAIN STEM W/O & W/DYE: CPT | Mod: TC

## 2023-11-03 RX ORDER — GADOBUTROL 604.72 MG/ML
6 INJECTION INTRAVENOUS
Status: COMPLETED | OUTPATIENT
Start: 2023-11-03 | End: 2023-11-03

## 2023-11-03 RX ADMIN — GADOBUTROL 10 ML: 604.72 INJECTION INTRAVENOUS at 12:11

## 2023-11-16 ENCOUNTER — HOSPITAL ENCOUNTER (EMERGENCY)
Facility: HOSPITAL | Age: 38
Discharge: HOME | End: 2023-11-16
Attending: EMERGENCY MEDICINE
Payer: MEDICAID

## 2023-11-16 VITALS
SYSTOLIC BLOOD PRESSURE: 126 MMHG | HEART RATE: 81 BPM | WEIGHT: 132.19 LBS | DIASTOLIC BLOOD PRESSURE: 67 MMHG | HEIGHT: 58 IN | TEMPERATURE: 98 F | BODY MASS INDEX: 27.75 KG/M2 | OXYGEN SATURATION: 97 % | RESPIRATION RATE: 18 BRPM

## 2023-11-16 DIAGNOSIS — N76.0 BACTERIAL VAGINOSIS: ICD-10-CM

## 2023-11-16 DIAGNOSIS — R56.9 CONVULSIONS, UNSPECIFIED CONVULSION TYPE: Primary | ICD-10-CM

## 2023-11-16 DIAGNOSIS — B96.89 BACTERIAL VAGINOSIS: ICD-10-CM

## 2023-11-16 DIAGNOSIS — N93.8 DUB (DYSFUNCTIONAL UTERINE BLEEDING): Primary | ICD-10-CM

## 2023-11-16 LAB
ALBUMIN SERPL BCP-MCNC: 3.3 G/DL (ref 3.5–5.2)
ALP SERPL-CCNC: 70 U/L (ref 55–135)
ALT SERPL W/O P-5'-P-CCNC: 11 U/L (ref 10–44)
ANION GAP SERPL CALC-SCNC: -1 MMOL/L (ref 3–11)
AST SERPL-CCNC: 11 U/L (ref 10–40)
B-HCG UR QL: NEGATIVE
BACTERIA #/AREA URNS HPF: NEGATIVE /HPF
BACTERIA GENITAL QL WET PREP: ABNORMAL
BASOPHILS # BLD AUTO: 0.02 K/UL (ref 0–0.2)
BASOPHILS NFR BLD: 0.2 % (ref 0–1.9)
BILIRUB SERPL-MCNC: 0.2 MG/DL (ref 0.1–1)
BILIRUB UR QL STRIP: NEGATIVE
BUN SERPL-MCNC: 12 MG/DL (ref 6–20)
CALCIUM SERPL-MCNC: 8.9 MG/DL (ref 8.7–10.5)
CHLORIDE SERPL-SCNC: 108 MMOL/L (ref 95–110)
CLARITY UR: CLEAR
CLUE CELLS VAG QL WET PREP: ABNORMAL
CO2 SERPL-SCNC: 33 MMOL/L (ref 23–29)
COLOR UR: YELLOW
CREAT SERPL-MCNC: 0.7 MG/DL (ref 0.5–1.4)
DIFFERENTIAL METHOD: ABNORMAL
EOSINOPHIL # BLD AUTO: 0.1 K/UL (ref 0–0.5)
EOSINOPHIL NFR BLD: 0.7 % (ref 0–8)
ERYTHROCYTE [DISTWIDTH] IN BLOOD BY AUTOMATED COUNT: 11.9 % (ref 11.5–14.5)
EST. GFR  (NO RACE VARIABLE): >60 ML/MIN/1.73 M^2
FILAMENT FUNGI VAG WET PREP-#/AREA: ABNORMAL
GLUCOSE SERPL-MCNC: 94 MG/DL (ref 70–110)
GLUCOSE UR QL STRIP: NEGATIVE
HCT VFR BLD AUTO: 39.8 % (ref 37–48.5)
HGB BLD-MCNC: 13.4 G/DL (ref 12–16)
HGB UR QL STRIP: ABNORMAL
HYALINE CASTS #/AREA URNS LPF: 0.7 /LPF
IMM GRANULOCYTES # BLD AUTO: 0.03 K/UL (ref 0–0.04)
IMM GRANULOCYTES NFR BLD AUTO: 0.4 % (ref 0–0.5)
KETONES UR QL STRIP: NEGATIVE
LEUKOCYTE ESTERASE UR QL STRIP: ABNORMAL
LYMPHOCYTES # BLD AUTO: 2.7 K/UL (ref 1–4.8)
LYMPHOCYTES NFR BLD: 32.6 % (ref 18–48)
MCH RBC QN AUTO: 34.8 PG (ref 27–31)
MCHC RBC AUTO-ENTMCNC: 33.7 G/DL (ref 32–36)
MCV RBC AUTO: 103 FL (ref 82–98)
MICROSCOPIC COMMENT: ABNORMAL
MONOCYTES # BLD AUTO: 0.6 K/UL (ref 0.3–1)
MONOCYTES NFR BLD: 7.5 % (ref 4–15)
NEUTROPHILS # BLD AUTO: 4.8 K/UL (ref 1.8–7.7)
NEUTROPHILS NFR BLD: 58.6 % (ref 38–73)
NITRITE UR QL STRIP: NEGATIVE
NRBC BLD-RTO: 0 /100 WBC
PH UR STRIP: 8 [PH] (ref 5–8)
PLATELET # BLD AUTO: 198 K/UL (ref 150–450)
PMV BLD AUTO: 11.6 FL (ref 9.2–12.9)
POTASSIUM SERPL-SCNC: 3.8 MMOL/L (ref 3.5–5.1)
PROT SERPL-MCNC: 6.9 G/DL (ref 6–8.4)
PROT UR QL STRIP: NEGATIVE
RBC # BLD AUTO: 3.85 M/UL (ref 4–5.4)
RBC #/AREA URNS HPF: 1 /HPF (ref 0–4)
SODIUM SERPL-SCNC: 140 MMOL/L (ref 136–145)
SP GR UR STRIP: <=1.005 (ref 1–1.03)
SPECIMEN SOURCE: ABNORMAL
SQUAMOUS #/AREA URNS HPF: 1 /HPF
T VAGINALIS GENITAL QL WET PREP: ABNORMAL
URN SPEC COLLECT METH UR: ABNORMAL
UROBILINOGEN UR STRIP-ACNC: NEGATIVE EU/DL
WBC # BLD AUTO: 8.22 K/UL (ref 3.9–12.7)
WBC #/AREA URNS HPF: 3 /HPF (ref 0–5)
WBC #/AREA VAG WET PREP: ABNORMAL
YEAST GENITAL QL WET PREP: ABNORMAL

## 2023-11-16 PROCEDURE — 36415 COLL VENOUS BLD VENIPUNCTURE: CPT

## 2023-11-16 PROCEDURE — 87210 SMEAR WET MOUNT SALINE/INK: CPT

## 2023-11-16 PROCEDURE — 99284 EMERGENCY DEPT VISIT MOD MDM: CPT

## 2023-11-16 PROCEDURE — 81025 URINE PREGNANCY TEST: CPT

## 2023-11-16 PROCEDURE — 80053 COMPREHEN METABOLIC PANEL: CPT

## 2023-11-16 PROCEDURE — 81000 URINALYSIS NONAUTO W/SCOPE: CPT

## 2023-11-16 PROCEDURE — 85025 COMPLETE CBC W/AUTO DIFF WBC: CPT

## 2023-11-16 RX ORDER — METRONIDAZOLE 500 MG/1
500 TABLET ORAL EVERY 12 HOURS
Qty: 14 TABLET | Refills: 0 | Status: SHIPPED | OUTPATIENT
Start: 2023-11-16 | End: 2023-11-23

## 2023-11-16 NOTE — DISCHARGE INSTRUCTIONS
You are having abnormal vaginal bleeding, however you are not losing an excess amount of blood requiring a blood transfusion.  Please establish care with a gynecologist or your primary care provider to explore your options on stopping the vaginal bleeding.  You do however have a bacterial infection of your vagina which could be causing your pain.  Take the antibiotics twice a day for 7 days.  You can also take Tylenol and ibuprofen as needed for pain.    Tiene sangrado vaginal anormal, sin embargo, no está perdiendo haleigh cantidad excesiva de josué que requiera haleigh transfusión de josué. Establezca atención con un ginecólogo o ramsey proveedor de atención primaria para explorar isiah opciones para detener el sangrado vaginal. Sin embargo, tienes haleigh infección bacteriana en la vagina que podría estar causando tu dolor. [place] los antibióticos dos veces al día sae 7 días. También puede darren Tylenol e ibuprofeno según sea necesario para el dolor.

## 2023-11-16 NOTE — ED PROVIDER NOTES
Encounter Date: 11/16/2023       History     Chief Complaint   Patient presents with    Vaginal Bleeding     Patient reports pain all over body with vaginal bleeding x 1 month. Patient is a poor historian.      38-year-old female with history of seizures presents to ED with complaints of generalized body aches, pelvic pain, vaginal bleeding for 45 days.  Patient reports going through 6 pads per hour.  No medications treatment attempted home.  Denies vaginal discharge.    The history is provided by the patient.     Review of patient's allergies indicates:  No Known Allergies  Past Medical History:   Diagnosis Date    Seizures      No past surgical history on file.  Family History   Problem Relation Age of Onset    Diabetes Mother     Hypertension Mother     Asthma Father      Social History     Tobacco Use    Smoking status: Never     Passive exposure: Never    Smokeless tobacco: Never   Substance Use Topics    Alcohol use: Never    Drug use: Never     Review of Systems   Constitutional:  Negative for fever.   HENT:  Negative for sore throat.    Eyes: Negative.    Respiratory:  Negative for shortness of breath.    Cardiovascular:  Negative for chest pain.   Gastrointestinal:  Negative for nausea.   Endocrine: Negative.    Genitourinary:  Positive for menstrual problem, pelvic pain and vaginal bleeding. Negative for dysuria.   Musculoskeletal:  Positive for myalgias. Negative for back pain.   Skin:  Negative for rash.   Allergic/Immunologic: Negative.    Neurological:  Negative for weakness.   Hematological:  Does not bruise/bleed easily.   Psychiatric/Behavioral: Negative.         Physical Exam     Initial Vitals [11/16/23 1211]   BP Pulse Resp Temp SpO2   126/67 81 18 98.3 °F (36.8 °C) 97 %      MAP       --         Physical Exam    Nursing note and vitals reviewed.  Constitutional: She appears well-developed and well-nourished.   HENT:   Head: Normocephalic and atraumatic.   Eyes: EOM are normal.   Neck: Neck  supple.   Normal range of motion.  Cardiovascular:  Normal rate and regular rhythm.           Pulmonary/Chest: No respiratory distress.   Abdominal: She exhibits no distension.   Genitourinary: Cervix exhibits no motion tenderness, no discharge and no friability. Right adnexum displays no tenderness. Left adnexum displays no tenderness.    Vaginal bleeding present.      No vaginal discharge, erythema or tenderness.   There is bleeding in the vagina. No erythema or tenderness in the vagina.    No foreign body in the vagina.     Musculoskeletal:         General: Normal range of motion.      Cervical back: Normal range of motion and neck supple.     Neurological: She is alert and oriented to person, place, and time.   Skin: Skin is warm and dry.   Psychiatric: Thought content normal.         ED Course   Procedures  Labs Reviewed   VAGINAL SCREEN - Abnormal; Notable for the following components:       Result Value    Clue Cells Rare (*)     WBC - Vaginal Screen Occasional (*)     Bacteria - Vaginal Screen Occasional (*)     All other components within normal limits    Narrative:     Specimen Source->Vagina  Release to patient->Immediate   CBC W/ AUTO DIFFERENTIAL - Abnormal; Notable for the following components:    RBC 3.85 (*)      (*)     MCH 34.8 (*)     All other components within normal limits   COMPREHENSIVE METABOLIC PANEL - Abnormal; Notable for the following components:    CO2 33 (*)     Albumin 3.3 (*)     Anion Gap -1 (*)     All other components within normal limits   URINALYSIS, REFLEX TO URINE CULTURE - Abnormal; Notable for the following components:    Specific Gravity, UA <=1.005 (*)     Occult Blood UA 1+ (*)     Leukocytes, UA Trace (*)     All other components within normal limits    Narrative:     Preferred Collection Type->Urine, Clean Catch  Specimen Source->Urine   URINALYSIS MICROSCOPIC - Abnormal; Notable for the following components:    Hyaline Casts, UA 0.7 (*)     All other components  within normal limits    Narrative:     Preferred Collection Type->Urine, Clean Catch  Specimen Source->Urine   PREGNANCY TEST, URINE RAPID    Narrative:     Specimen Source->Urine          Imaging Results    None          Medications - No data to display  Medical Decision Making  Ddx:  Dysfunctional uterine bleeding, BV, anemia, cystitis, PID, pregnancy      38-year-old female with history of seizures presents to ED for above complaints.  She reports heavy vaginal bleeding for 45 days.  CBC obtained and was unremarkable.  No indication for transfusion at this time.  CMP was also unremarkable.  UA negative for signs of infection.  Vaginal screen significant for bacteria, white blood cells, clue cells.  Will treat for bacterial vaginosis with Flagyl.  Advised patient to follow up with primary care or gyn for further assessment and treatment for dysfunctional uterine bleeding.  Return precautions given.  Patient was stable for discharge.    Amount and/or Complexity of Data Reviewed  Labs: ordered.    Risk  Prescription drug management.                                   Clinical Impression:  Final diagnoses:  [N93.8] DUB (dysfunctional uterine bleeding) (Primary)  [N76.0, B96.89] Bacterial vaginosis          ED Disposition Condition    Discharge Stable          ED Prescriptions       Medication Sig Dispense Start Date End Date Auth. Provider    metroNIDAZOLE (FLAGYL) 500 MG tablet Take 1 tablet (500 mg total) by mouth every 12 (twelve) hours. for 7 days 14 tablet 11/16/2023 11/23/2023 Eloy Loredo NP          Follow-up Information       Follow up With Specialties Details Why Contact Info    Srinath Correia II, NP Emergency Medicine In 2 days  [address] [facility] [address]  [phone]      Toshia Mahoney MD Obstetrics and Gynecology In 2 days  [address] [address]  [phone]               Eloy Loredo NP  11/16/23 7585

## 2024-06-06 RX ORDER — DIVALPROEX SODIUM 500 MG/1
TABLET, DELAYED RELEASE ORAL
Qty: 60 TABLET | Refills: 7 | Status: SHIPPED | OUTPATIENT
Start: 2024-06-06

## 2024-06-06 NOTE — TELEPHONE ENCOUNTER
Please see the attached refill request. Last office visit 10/23/2023, RTC 3 months, next visit scheduled for 7/30/2024.

## 2024-10-14 RX ORDER — DIVALPROEX SODIUM 500 MG/1
500 TABLET, DELAYED RELEASE ORAL EVERY 12 HOURS
Qty: 60 TABLET | Refills: 1 | Status: SHIPPED | OUTPATIENT
Start: 2024-10-14

## 2024-10-31 RX ORDER — LEVETIRACETAM 500 MG/1
TABLET ORAL
Qty: 180 TABLET | Refills: 1 | Status: SHIPPED | OUTPATIENT
Start: 2024-10-31

## 2024-12-06 ENCOUNTER — PATIENT MESSAGE (OUTPATIENT)
Dept: ADMINISTRATIVE | Facility: HOSPITAL | Age: 39
End: 2024-12-06

## 2024-12-16 RX ORDER — DIVALPROEX SODIUM 500 MG/1
500 TABLET, DELAYED RELEASE ORAL EVERY 12 HOURS
Qty: 60 TABLET | Refills: 1 | Status: SHIPPED | OUTPATIENT
Start: 2024-12-16

## 2024-12-16 RX ORDER — LEVETIRACETAM 500 MG/1
TABLET ORAL
Qty: 180 TABLET | Refills: 1 | Status: SHIPPED | OUTPATIENT
Start: 2024-12-16

## 2024-12-16 NOTE — TELEPHONE ENCOUNTER
----- Message from Med Assistant Kasper sent at 2024  7:17 AM CST -----  Contact: [facility]    ----- Message -----  From: Nini Evans  Sent: 2024   1:57 PM CST  To: Melissa VIDAL[facility] Staff    Jen March  MRN: [id number]  : 1985  PCP: Srinath Correia II  Home Phone      [phone]  Work Phone      Not on file.  Mobile          [phone]      MESSAGE: Patient is needing a refill on Keppra & Depakot but is also needing an appointment.  First available is 2025.   states that the patient is out of medication.  He would like the medication sent to [facility]/[address].        Phone: [phone]

## 2025-02-19 NOTE — TELEPHONE ENCOUNTER
Patient needs appt. Called patient using language line  [facility] #[id number], patient does not have a voicemail set up.

## 2025-02-20 RX ORDER — DIVALPROEX SODIUM 500 MG/1
TABLET, DELAYED RELEASE ORAL
Qty: 60 TABLET | Refills: 0 | Status: SHIPPED | OUTPATIENT
Start: 2025-02-20

## 2025-03-31 RX ORDER — DIVALPROEX SODIUM 500 MG/1
TABLET, DELAYED RELEASE ORAL
Qty: 60 TABLET | Refills: 0 | Status: SHIPPED | OUTPATIENT
Start: 2025-03-31

## 2025-03-31 NOTE — TELEPHONE ENCOUNTER
Called patient using language line,  [place] #[id number], patient is scheduled 5/19/25 for a follow up.     Last OV 10/23/23.

## 2025-05-05 RX ORDER — DIVALPROEX SODIUM 500 MG/1
TABLET, DELAYED RELEASE ORAL
Qty: 60 TABLET | Refills: 0 | Status: SHIPPED | OUTPATIENT
Start: 2025-05-05

## 2025-05-05 RX ORDER — LEVETIRACETAM 500 MG/1
TABLET ORAL
Qty: 180 TABLET | Refills: 0 | Status: SHIPPED | OUTPATIENT
Start: 2025-05-05